# Patient Record
Sex: MALE | Race: WHITE | NOT HISPANIC OR LATINO | Employment: UNEMPLOYED | URBAN - METROPOLITAN AREA
[De-identification: names, ages, dates, MRNs, and addresses within clinical notes are randomized per-mention and may not be internally consistent; named-entity substitution may affect disease eponyms.]

---

## 2017-01-11 ENCOUNTER — HOSPITAL ENCOUNTER (EMERGENCY)
Facility: HOSPITAL | Age: 22
Discharge: HOME | End: 2017-01-11
Attending: EMERGENCY MEDICINE
Payer: MEDICAID

## 2017-01-11 VITALS
BODY MASS INDEX: 17.89 KG/M2 | TEMPERATURE: 98 F | DIASTOLIC BLOOD PRESSURE: 60 MMHG | HEART RATE: 82 BPM | RESPIRATION RATE: 18 BRPM | WEIGHT: 135 LBS | HEIGHT: 73 IN | OXYGEN SATURATION: 99 % | SYSTOLIC BLOOD PRESSURE: 124 MMHG

## 2017-01-11 DIAGNOSIS — R05.9 COUGH: ICD-10-CM

## 2017-01-11 DIAGNOSIS — J11.1 INFLUENZA: Primary | ICD-10-CM

## 2017-01-11 PROCEDURE — 99283 EMERGENCY DEPT VISIT LOW MDM: CPT

## 2017-01-11 PROCEDURE — 25000003 PHARM REV CODE 250: Performed by: NURSE PRACTITIONER

## 2017-01-11 RX ORDER — ONDANSETRON 4 MG/1
4 TABLET, ORALLY DISINTEGRATING ORAL
Status: COMPLETED | OUTPATIENT
Start: 2017-01-11 | End: 2017-01-11

## 2017-01-11 RX ORDER — ACETAMINOPHEN 500 MG
1000 TABLET ORAL
Status: COMPLETED | OUTPATIENT
Start: 2017-01-11 | End: 2017-01-11

## 2017-01-11 RX ORDER — ONDANSETRON 4 MG/1
4 TABLET, ORALLY DISINTEGRATING ORAL EVERY 8 HOURS PRN
Qty: 15 TABLET | Refills: 0 | Status: SHIPPED | OUTPATIENT
Start: 2017-01-11 | End: 2021-06-03

## 2017-01-11 RX ORDER — PROMETHAZINE HYDROCHLORIDE AND CODEINE PHOSPHATE 6.25; 1 MG/5ML; MG/5ML
5 SOLUTION ORAL EVERY 4 HOURS PRN
Qty: 118 ML | Refills: 0 | Status: SHIPPED | OUTPATIENT
Start: 2017-01-11 | End: 2017-01-21

## 2017-01-11 RX ADMIN — ONDANSETRON 4 MG: 4 TABLET, ORALLY DISINTEGRATING ORAL at 02:01

## 2017-01-11 RX ADMIN — ACETAMINOPHEN 1000 MG: 500 TABLET ORAL at 02:01

## 2017-01-11 NOTE — DISCHARGE INSTRUCTIONS
Please get plenty of rest and drink lots of water to stay well-hydrated.    Try to start with clear liquids, and if tolerated, you can graduate to solid foods.  Eat a bland diet; avoid eating spicy, greasy, and acidic foods (see handout).    Take zofran for nausea/vomiting, as needed.    You can take phenergan with codeine for your cough, though use with caution, as it may cause drowsiness.    Take tylenol and/or ibuprofen for fevers and bodyaches.    Follow up with your regular doctor in 2-3 days for further evaluation of your symptoms, if they persist.    Return to the ER for any new/worsening symptoms.

## 2017-01-11 NOTE — ED TRIAGE NOTES
"Pt presents to the ED personal transport. Pt reports a dry itchy, cough x 3 days w/ a sore throat. Pt also reporting decreased appetite, headache ("everywhere"), and generalized myalgias for 3 days. Pt reports he was seen at [facility] 2 days ago and told to take Zyrtec. Pt reports he has taken 5 Zyrtec since, with other OTC medications w/ no relief. Pt reports he has not been able to sleep, pt reports "I just really want to sleep."   "

## 2017-01-11 NOTE — ED AVS SNAPSHOT
[facility]  [place] [address]               Galileo QUACH [place]   2017  1:31 AM   ED    Description:  Male : 1995   Department:  [facility]           Your Care was Coordinated By:     Provider Role From To    Evy Cisse NP Nurse Practitioner 17 0132 --      Reason for Visit     URI           Diagnoses this Visit        Comments    Influenza    -  Primary     Cough           ED Disposition     ED Disposition Condition Comment    Discharge             To Do List           Follow-up Information     Follow up with [facility].    Specialty:  Emergency Medicine    Why:  If symptoms worsen    Contact information:    [address] [place] [address] [phone]        Follow up with Anju Ruffin MD In 3 days.    Specialty:  Pediatrics    Why:  As needed    Contact information:    [place] [address]  [phone]         These Medications        Disp Refills Start End    promethazine-codeine 6.25-10 mg/5 ml (PHENERGAN WITH CODEINE) 6.25-10 mg/5 mL syrup 118 mL 0 2017    Take 5 mLs by mouth every 4 (four) hours as needed for Cough. - Oral    ondansetron (ZOFRAN-ODT) 4 MG TbDL 15 tablet 0 2017     Take 1 tablet (4 mg total) by mouth every 8 (eight) hours as needed (Nausea or vomiting). - Oral      [facility]     [facility] -  Assistance  Registered nurses in the [facility] provide clinical advisement, health education, appointment booking, and other advisory services.  Call for this free service at [phone].             Medications           START taking these NEW medications        Refills    promethazine-codeine 6.25-10 mg/5 ml (PHENERGAN WITH CODEINE) 6.25-10 mg/5 mL syrup 0    Sig: Take 5 mLs by mouth every 4 (four) hours as needed for Cough.    Class: Print    Route: Oral    ondansetron  "(ZOFRAN-ODT) 4 MG TbDL 0    Sig: Take 1 tablet (4 mg total) by mouth every 8 (eight) hours as needed (Nausea or vomiting).    Class: Print    Route: Oral      These medications were administered today        Dose Freq    acetaminophen tablet 1,000 mg 1,000 mg ED 1 Time    Sig: Take 2 tablets (1,000 mg total) by mouth ED 1 Time.    Class: Normal    Route: Oral    ondansetron disintegrating tablet 4 mg 4 mg ED 1 Time    Sig: Take 1 tablet (4 mg total) by mouth ED 1 Time.    Class: Normal    Route: Oral           Verify that the below list of medications is an accurate representation of the medications you are currently taking.  If none reported, the list may be blank. If incorrect, please contact your healthcare provider. Carry this list with you in case of emergency.           Current Medications     ondansetron (ZOFRAN-ODT) 4 MG TbDL Take 1 tablet (4 mg total) by mouth every 8 (eight) hours as needed (Nausea or vomiting).    promethazine-codeine 6.25-10 mg/5 ml (PHENERGAN WITH CODEINE) 6.25-10 mg/5 mL syrup Take 5 mLs by mouth every 4 (four) hours as needed for Cough.           Clinical Reference Information           Your Vitals Were     BP Pulse Temp Resp Height Weight    121/62 (BP Location: Right arm, Patient Position: Sitting) 89 98.2 °F (36.8 °C) (Oral) 18 6' 1" (1.854 m) 61.2 kg (135 lb)    SpO2 BMI             99% 17.81 kg/m2         Allergies as of 1/11/2017        Reactions    Nuts [Tree Nut] Swelling    Peanuts. Has epipen in car.       Immunizations Administered on Date of Encounter - 1/11/2017     None      ED Micro, Lab, POCT     None      ED Imaging Orders     Start Ordered       Status Ordering Provider    01/11/17 0154 01/11/17 0154  X-Ray Chest PA And Lateral  1 time imaging      Final result         Discharge Instructions       Please get plenty of rest and drink lots of water to stay well-hydrated.    Try to start with clear liquids, and if tolerated, you can graduate to solid foods.  Eat a bland " diet; avoid eating spicy, greasy, and acidic foods (see handout).    Take zofran for nausea/vomiting, as needed.    You can take phenergan with codeine for your cough, though use with caution, as it may cause drowsiness.    Take tylenol and/or ibuprofen for fevers and bodyaches.    Follow up with your regular doctor in 2-3 days for further evaluation of your symptoms, if they persist.    Return to the ER for any new/worsening symptoms.        Discharge References/Attachments     INFLUENZA (ADULT) (ENGLISH)    DIET, VOMITING OR DIARRHEA (ADULT) (ENGLISH)    FEVER CONTROL (ADULT) (ENGLISH)      [facility] Sign-Up     Activating your [facility] account is as easy as 1-2-3!     1) Visit [email or web address], select Sign Up Now, enter this activation code and your date of birth, then select Next.  [email or web address]  Expires: 2/25/2017  2:42 AM      2) Create a username and password to use when you visit [facility] in the future and select a security question in case you lose your password and select Next.    3) Enter your e-mail address and click Sign Up!    Additional Information  If you have questions, please e-mail [email or web address] or call [phone] to talk to our [facility] staff. Remember, [facility] is NOT to be used for urgent needs. For medical emergencies, dial 911.         Smoking Cessation     If you would like to quit smoking:   You may be eligible for free services if you are a [place] resident and started smoking cigarettes before September 1, 1988.  Call the Smoking Cessation Trust (SCT) toll free at (106) 462-9157 or (907) 760-6702.   Call 9-112-QUIT-NOW if you do not meet the above criteria.             [facility] complies with applicable Federal civil rights laws and does not discriminate on the basis of race, color, national origin, age, disability, or sex.        Language Assistance Services     ATTENTION: Language assistance services are available, free of charge. Please call  [phone].      ATENCIÓN: Si habla español, tiene a arreguin disposición servicios gratuitos de asistencia lingüística. Llame al [phone].     CHÚ Ý: N?u b?n nói Ti?ng Vi?t, có các d?ch v? h? tr? ngôn ng? mi?n phí dành cho b?n. G?i s? [phone].

## 2017-01-11 NOTE — ED PROVIDER NOTES
Encounter Date: 1/11/2017    SCRIBE #1 NOTE: I, Melissa Manuel, am scribing for, and in the presence of,  RICARDO Pandya. I have scribed the following portions of the note - Other sections scribed: HPI, ROS.       History     Chief Complaint   Patient presents with    URI     Nasal drainage, cough, and sore throat.      Review of patient's allergies indicates:   Allergen Reactions    Nuts [tree nut] Swelling     Peanuts. Has epipen in car.      HPI Comments: CC: URI    HPI: 21 year old male smoker with HTN presents to the ED c/o fever (Tmax 102F), decreased appetite, sore throat, productive cough that is currently dry, headaches, generalized body aches, rhinorrhea, nasal congestion, and NBNB vomiting (> 10x) x 2 days. Symptoms are acute onset and severe (10/10). Pt states he was evaluated at [facility] and was discharged home with Zyrtec which provided no relief. He reports he also had treatment with child cold and cough syrup a few hours PTA with no relief. He states he is unable to eat solids and reports only able to tolerate a sip of Sprite earlier today. Pt denies chest pain, SOB, abdominal pain, dysuria, diarrhea, and any other associated symptoms. No alleviating factors.    The history is provided by the patient. No  was used.     Past Medical History   Diagnosis Date    Hypertension     IBS (irritable bowel syndrome)      No past medical history pertinent negatives.  No past surgical history on file.  Family History   Problem Relation Age of Onset    Heart disease Father     Diabetes Maternal Grandfather     Hypertension Maternal Grandfather     Drug abuse Paternal Grandmother      Social History   Substance Use Topics    Smoking status: Current Every Day Smoker     Packs/day: 1.00     Types: Cigarettes    Smokeless tobacco: None    Alcohol use No     Review of Systems   Constitutional: Positive for appetite change (decreased appetite) and fever (Tmax 102 F). Negative for chills and  diaphoresis.   HENT: Positive for congestion, rhinorrhea and sore throat. Negative for ear pain.    Eyes: Negative for photophobia and visual disturbance.   Respiratory: Positive for cough. Negative for shortness of breath.    Cardiovascular: Negative for chest pain.   Gastrointestinal: Positive for nausea and vomiting. Negative for abdominal pain and diarrhea.   Genitourinary: Negative for dysuria.   Musculoskeletal: Positive for myalgias (generalized body aches). Negative for back pain.   Skin: Negative for rash.   Neurological: Positive for headaches.       Physical Exam   Initial Vitals   BP Pulse Resp Temp SpO2   01/11/17 0119 01/11/17 0119 01/11/17 0119 01/11/17 0119 01/11/17 0119   121/62 89 18 98.2 °F (36.8 °C) 99 %     Physical Exam    Nursing note and vitals reviewed.  Constitutional: He appears well-developed and well-nourished. He is not diaphoretic. He appears distressed.   HENT:   Nose: Mucosal edema and rhinorrhea present. No sinus tenderness.   Mouth/Throat: Uvula is midline and mucous membranes are normal. Mucous membranes are not dry. No trismus in the jaw. Posterior oropharyngeal erythema present. No oropharyngeal exudate, posterior oropharyngeal edema or tonsillar abscesses.   Bilateral TMs clear   Eyes: EOM are normal. Pupils are equal, round, and reactive to light.   Neck: Phonation normal. Neck supple. No stridor present.   No meningismus   Cardiovascular: Normal heart sounds.   Pulmonary/Chest: No respiratory distress. He has no wheezes. He has no rales.   Slight expiratory rhonchi noted to all fields   Abdominal: Soft. Bowel sounds are normal. He exhibits no distension and no mass. There is no rebound and no guarding.   No focal abdominal tenderness   Lymphadenopathy:     He has no cervical adenopathy.   Neurological: He is alert and oriented to person, place, and time.   Skin: Skin is warm and dry.         ED Course   Procedures  Labs Reviewed - No data to display       X-Rays:    Independently Interpreted Readings:   Chest X-Ray: No infiltrates.  No acute abnormalities. No focal consolidations or infiltrates        Additional MDM:   Comments: This is an urgent evaluation of a 21-year-old male that presents to the emergency room with cold symptoms for 3 days.  Patient reports associated fevers, myalgias, rhinorrhea, sore throat, cough. He appears ill, but is nontoxic. He is afebrile with normal vital signs.  His exam was suggestive of a viral respiratory illness, though he did have expiratory rhonchi noted to all fields; patient reports that he recently quit smoking over new years.  His CXR was negative for pneumonia. I suspect this is influenza.  There is no evidence of other serious bacterial infection such as meningitis, strep pharyngitis, AOM, sinusitis, or acute abdomen.  He does not appear severely dehydrated.  He was given zofran in ED with resolution of nausea; he declined to wait for PO challenge.  His symptoms have been >72 hours and does not meet criteria for influenza testing/tamiflu treatment.  I see no indication for further workup today.    Encouraged oral hydration, rest, and fever care.  Rx zofran and antitussive for supportive care.  To f/u with PCP in 2-3 days.  Return precautions for new or worsening symptoms or concerns.  Case discussed with attending, , and he is in agreement with plan. Stable for discharge and outpatient follow.  .          Scribe Attestation:   Scribe #1: I performed the above scribed service and the documentation accurately describes the services I performed. I attest to the accuracy of the note.    Attending Attestation:     Physician Attestation Statement for NP/PA:   I discussed this assessment and plan of this patient with the NP/PA, but I did not personally examine the patient. The face to face encounter was performed by the NP/PA.    Other NP/PA Attestation Additions:      Medical Decision Makin-year-old male presents complaining  of fever, sore throat, productive cough, body aches, rhinorrhea, vomiting for the last 2 days.  Patient was reportedly diagnosed with the flu at an outside facility but has not had adequate relief.  Physical examination does not reveal any significant vital sign abnormalities, abdominal tenderness, or concerning cardiopulmonary findings.    I have personally reviewed and interpreted the patient's chest x-ray and there is no acute abnormality.    Agree with above plan.       Physician Attestation for Scribe:  Physician Attestation Statement for Scribe #1: I, Evy Cisse, CLARA-C, reviewed documentation, as scribed by Melissa Manuel in my presence, and it is both accurate and complete.                 ED Course     Clinical Impression:   The primary encounter diagnosis was Influenza. A diagnosis of Cough was also pertinent to this visit.    Disposition:   Disposition: Discharged  Condition: Stable       Evy Cisse NP  01/11/17 0445

## 2017-01-12 ENCOUNTER — HOSPITAL ENCOUNTER (EMERGENCY)
Facility: HOSPITAL | Age: 22
Discharge: HOME | End: 2017-01-13
Attending: EMERGENCY MEDICINE
Payer: MEDICAID

## 2017-01-12 DIAGNOSIS — R00.0 TACHYCARDIA: ICD-10-CM

## 2017-01-12 DIAGNOSIS — R05.9 COUGH: Primary | ICD-10-CM

## 2017-01-12 DIAGNOSIS — R10.84 GENERALIZED ABDOMINAL CRAMPING: ICD-10-CM

## 2017-01-12 DIAGNOSIS — M79.10 MYALGIA: ICD-10-CM

## 2017-01-12 LAB
ALBUMIN SERPL BCP-MCNC: 3.1 G/DL
ALP SERPL-CCNC: 80 U/L
ALT SERPL W/O P-5'-P-CCNC: 9 U/L
ANION GAP SERPL CALC-SCNC: 13 MMOL/L
AST SERPL-CCNC: 20 U/L
BASOPHILS # BLD AUTO: 0.01 K/UL
BASOPHILS NFR BLD: 0.1 %
BILIRUB SERPL-MCNC: 1 MG/DL
BUN SERPL-MCNC: 8 MG/DL
CALCIUM SERPL-MCNC: 8.6 MG/DL
CHLORIDE SERPL-SCNC: 106 MMOL/L
CO2 SERPL-SCNC: 20 MMOL/L
CREAT SERPL-MCNC: 1 MG/DL
DIFFERENTIAL METHOD: ABNORMAL
EOSINOPHIL # BLD AUTO: 0 K/UL
EOSINOPHIL NFR BLD: 0.2 %
ERYTHROCYTE [DISTWIDTH] IN BLOOD BY AUTOMATED COUNT: 12.3 %
EST. GFR  (AFRICAN AMERICAN): >60 ML/MIN/1.73 M^2
EST. GFR  (NON AFRICAN AMERICAN): >60 ML/MIN/1.73 M^2
GLUCOSE SERPL-MCNC: 150 MG/DL
HCT VFR BLD AUTO: 37.5 %
HGB BLD-MCNC: 13.6 G/DL
LACTATE SERPL-SCNC: 2.3 MMOL/L
LYMPHOCYTES # BLD AUTO: 2 K/UL
LYMPHOCYTES NFR BLD: 13.5 %
MAGNESIUM SERPL-MCNC: 1.5 MG/DL
MCH RBC QN AUTO: 31 PG
MCHC RBC AUTO-ENTMCNC: 36.3 %
MCV RBC AUTO: 85 FL
MONOCYTES # BLD AUTO: 0.8 K/UL
MONOCYTES NFR BLD: 5.7 %
NEUTROPHILS # BLD AUTO: 11.6 K/UL
NEUTROPHILS NFR BLD: 80.5 %
PLATELET # BLD AUTO: 180 K/UL
PMV BLD AUTO: 9.1 FL
POTASSIUM SERPL-SCNC: 2.8 MMOL/L
PROT SERPL-MCNC: 6.6 G/DL
RBC # BLD AUTO: 4.39 M/UL
SODIUM SERPL-SCNC: 139 MMOL/L
WBC # BLD AUTO: 14.45 K/UL

## 2017-01-12 PROCEDURE — 99285 EMERGENCY DEPT VISIT HI MDM: CPT | Mod: 25

## 2017-01-12 PROCEDURE — 25000003 PHARM REV CODE 250: Performed by: EMERGENCY MEDICINE

## 2017-01-12 PROCEDURE — 94640 AIRWAY INHALATION TREATMENT: CPT

## 2017-01-12 PROCEDURE — 85025 COMPLETE CBC W/AUTO DIFF WBC: CPT

## 2017-01-12 PROCEDURE — 80053 COMPREHEN METABOLIC PANEL: CPT

## 2017-01-12 PROCEDURE — 84443 ASSAY THYROID STIM HORMONE: CPT

## 2017-01-12 PROCEDURE — 25000242 PHARM REV CODE 250 ALT 637 W/ HCPCS: Performed by: EMERGENCY MEDICINE

## 2017-01-12 PROCEDURE — 96365 THER/PROPH/DIAG IV INF INIT: CPT

## 2017-01-12 PROCEDURE — 81003 URINALYSIS AUTO W/O SCOPE: CPT

## 2017-01-12 PROCEDURE — 96361 HYDRATE IV INFUSION ADD-ON: CPT

## 2017-01-12 PROCEDURE — 63600175 PHARM REV CODE 636 W HCPCS: Performed by: EMERGENCY MEDICINE

## 2017-01-12 PROCEDURE — 83735 ASSAY OF MAGNESIUM: CPT

## 2017-01-12 PROCEDURE — 87086 URINE CULTURE/COLONY COUNT: CPT

## 2017-01-12 PROCEDURE — 83605 ASSAY OF LACTIC ACID: CPT

## 2017-01-12 PROCEDURE — 87400 INFLUENZA A/B EACH AG IA: CPT

## 2017-01-12 PROCEDURE — 87040 BLOOD CULTURE FOR BACTERIA: CPT | Mod: 59

## 2017-01-12 RX ORDER — IPRATROPIUM BROMIDE AND ALBUTEROL SULFATE 2.5; .5 MG/3ML; MG/3ML
3 SOLUTION RESPIRATORY (INHALATION)
Status: COMPLETED | OUTPATIENT
Start: 2017-01-12 | End: 2017-01-12

## 2017-01-12 RX ORDER — POTASSIUM CHLORIDE 20 MEQ/1
60 TABLET, EXTENDED RELEASE ORAL
Status: COMPLETED | OUTPATIENT
Start: 2017-01-12 | End: 2017-01-12

## 2017-01-12 RX ORDER — SODIUM CHLORIDE 9 MG/ML
1000 INJECTION, SOLUTION INTRAVENOUS
Status: COMPLETED | OUTPATIENT
Start: 2017-01-12 | End: 2017-01-13

## 2017-01-12 RX ORDER — MAGNESIUM SULFATE 1 G/100ML
1 INJECTION INTRAVENOUS
Status: COMPLETED | OUTPATIENT
Start: 2017-01-12 | End: 2017-01-13

## 2017-01-12 RX ADMIN — IPRATROPIUM BROMIDE AND ALBUTEROL SULFATE 3 ML: .5; 3 SOLUTION RESPIRATORY (INHALATION) at 11:01

## 2017-01-12 RX ADMIN — MAGNESIUM SULFATE IN DEXTROSE 1 G: 10 INJECTION, SOLUTION INTRAVENOUS at 11:01

## 2017-01-12 RX ADMIN — SODIUM CHLORIDE 1000 ML: 0.9 INJECTION, SOLUTION INTRAVENOUS at 10:01

## 2017-01-12 RX ADMIN — IPRATROPIUM BROMIDE AND ALBUTEROL SULFATE 3 ML: .5; 3 SOLUTION RESPIRATORY (INHALATION) at 10:01

## 2017-01-12 RX ADMIN — SODIUM CHLORIDE 1000 ML: 0.9 INJECTION, SOLUTION INTRAVENOUS at 11:01

## 2017-01-12 RX ADMIN — POTASSIUM CHLORIDE 60 MEQ: 1500 TABLET, EXTENDED RELEASE ORAL at 11:01

## 2017-01-12 NOTE — ED AVS SNAPSHOT
[facility]  [address] [address]               Galileo QUACH [place]   2017 10:14 PM   ED    Description:  Male : 1995   Department:  [facility]           Your Care was Coordinated By:     Provider Role From To    Elder Vang MD Attending Provider 17 2215 --      Reason for Visit     Shortness of Breath           Diagnoses this Visit        Comments    Cough    -  Primary     Myalgia         Generalized abdominal cramping         Tachycardia           ED Disposition     ED Disposition Condition Comment    Discharge             To Do List           Follow-up Information     Follow up with Your doctor. Schedule an appointment as soon as possible for a visit in 3 days.       These Medications        Disp Refills Start End    azithromycin (Z-ANNA) 250 MG tablet 6 tablet 0 2017    Take 1 tablet (250 mg total) by mouth once daily. Take first 2 tablets together, then 1 every day until finished. - Oral      [facility]     [facility] Care [facility] -  Assistance  Registered nurses in the [facility] provide clinical advisement, health education, appointment booking, and other advisory services.  Call for this free service at [phone].             Medications           START taking these NEW medications        Refills    azithromycin (Z-ANNA) 250 MG tablet 0    Sig: Take 1 tablet (250 mg total) by mouth once daily. Take first 2 tablets together, then 1 every day until finished.    Class: Print    Route: Oral      These medications were administered today        Dose Freq    0.9%  NaCl infusion 1,000 mL ED 1 Time    Sig: Inject 1,000 mLs into the vein ED 1 Time.    Class: Normal    Route: Intravenous    albuterol-ipratropium 2.5mg-0.5mg/3mL nebulizer solution 3 mL 3 mL Every 5 min    Sig: Take 3 mLs by nebulization every 5 (five) minutes.    Class: Normal    Route: Nebulization    0.9%   NaCl infusion 1,000 mL ED 1 Time    Sig: Inject 1,000 mLs into the vein ED 1 Time.    Class: Normal    Route: Intravenous    magnesium sulfate in dextrose IVPB (premix) 1 g 1 g ED 1 Time    Sig: Inject 100 mLs (1 g total) into the vein ED 1 Time.    Class: Normal    Route: Intravenous    potassium chloride SA CR tablet 60 mEq 60 mEq ED 1 Time    Sig: Take 3 tablets (60 mEq total) by mouth ED 1 Time.    Class: Normal    Route: Oral    omnipaque 350 iohexol 75 mL 75 mL IMG once as needed    Sig: Inject 75 mLs into the vein ONCE PRN for contrast.    Class: Normal    Route: Intravenous    0.9%  NaCl infusion 1,000 mL ED 1 Time    Sig: Inject 1,000 mLs into the vein ED 1 Time.    Class: Normal    Route: Intravenous           Verify that the below list of medications is an accurate representation of the medications you are currently taking.  If none reported, the list may be blank. If incorrect, please contact your healthcare provider. Carry this list with you in case of emergency.           Current Medications     azithromycin (Z-ANNA) 250 MG tablet Take 1 tablet (250 mg total) by mouth once daily. Take first 2 tablets together, then 1 every day until finished.    ondansetron (ZOFRAN-ODT) 4 MG TbDL Take 1 tablet (4 mg total) by mouth every 8 (eight) hours as needed (Nausea or vomiting).    promethazine-codeine 6.25-10 mg/5 ml (PHENERGAN WITH CODEINE) 6.25-10 mg/5 mL syrup Take 5 mLs by mouth every 4 (four) hours as needed for Cough.           Clinical Reference Information           Your Vitals Were     BP Pulse Temp Resp Height Weight    111/53 118 99 °F (37.2 °C) (Oral) 18 6' (1.829 m) 59 kg (130 lb)    SpO2 BMI             95% 17.63 kg/m2         Allergies as of 1/13/2017        Reactions    Nuts [Tree Nut] Swelling    Peanuts. Has epipen in car.       Immunizations Administered on Date of Encounter - 1/13/2017     None      ED Micro, Lab, POCT     Start Ordered       Status Ordering Provider    01/13/17 0127 01/13/17  0126  TSH  STAT      Final result     01/12/17 2256 01/12/17 2255  Influenza antigen  STAT      Final result     01/12/17 2256 01/12/17 2255  Blood culture  STAT      In process     01/12/17 2256 01/12/17 2255  Blood culture  STAT      In process     01/12/17 2256 01/12/17 2255    STAT,   Status:  Canceled      Canceled     01/12/17 2256 01/12/17 2255  Urinalysis  STAT      Final result     01/12/17 2256 01/12/17 2255  Urine culture  STAT      In process     01/12/17 2221 01/12/17 2220  CBC auto differential  STAT      Final result     01/12/17 2221 01/12/17 2220  Comprehensive metabolic panel  STAT      Final result     01/12/17 2221 01/12/17 2220  Magnesium  STAT      Final result     01/12/17 2220 01/12/17 2220  Lactic acid, plasma  STAT      Final result       ED Imaging Orders     Start Ordered       Status Ordering Provider    01/13/17 0008 01/13/17 0007  CTA Chest Non-Coronary (PE Study)  1 time imaging      Final result     01/12/17 2221 01/12/17 2220  X-Ray Chest AP Portable  1 time imaging      Final result       Discharge References/Attachments     ABDOMINAL PAIN, ADULT (ENGLISH)    MYALGIAS (ENGLISH)    VIRAL SYNDROME (ADULT) (ENGLISH)      [facility] Sign-Up     Activating your [facility] account is as easy as 1-2-3!     1) Visit [email or web address], select Sign Up Now, enter this activation code and your date of birth, then select Next.  [email or web address]  Expires: 2/25/2017  2:42 AM      2) Create a username and password to use when you visit [facility] in the future and select a security question in case you lose your password and select Next.    3) Enter your e-mail address and click Sign Up!    Additional Information  If you have questions, please e-mail [email or web address] or call [phone] to talk to our [facility] staff. Remember, [facility] is NOT to be used for urgent needs. For medical emergencies, dial 911.         Smoking Cessation     If you would like to quit smoking:   You may be eligible  for free services if you are a [place] resident and started smoking cigarettes before September 1, 1988.  Call the Smoking Cessation Trust (SCT) toll free at (245) 579-2006 or (580) 247-7569.   Call 1-800-QUIT-NOW if you do not meet the above criteria.             [facility] complies with applicable Federal civil rights laws and does not discriminate on the basis of race, color, national origin, age, disability, or sex.        Language Assistance Services     ATTENTION: Language assistance services are available, free of charge. Please call [phone].      ATENCIÓN: Si habla español, tiene a arreguin disposición servicios gratuitos de asistencia lingüística. Llame al [phone].     CHÚ Ý: N?u b?n nói Ti?ng Vi?t, có các d?ch v? h? tr? ngôn ng? mi?n phí dành cho b?n. G?i s? [phone].

## 2017-01-13 VITALS
DIASTOLIC BLOOD PRESSURE: 64 MMHG | HEART RATE: 99 BPM | OXYGEN SATURATION: 100 % | SYSTOLIC BLOOD PRESSURE: 114 MMHG | RESPIRATION RATE: 20 BRPM | HEIGHT: 72 IN | BODY MASS INDEX: 17.61 KG/M2 | TEMPERATURE: 98 F | WEIGHT: 130 LBS

## 2017-01-13 LAB
BILIRUB UR QL STRIP: NEGATIVE
CLARITY UR: CLEAR
COLOR UR: YELLOW
FLUAV AG SPEC QL IA: NEGATIVE
FLUBV AG SPEC QL IA: NEGATIVE
GLUCOSE UR QL STRIP: ABNORMAL
HGB UR QL STRIP: NEGATIVE
KETONES UR QL STRIP: ABNORMAL
LEUKOCYTE ESTERASE UR QL STRIP: NEGATIVE
NITRITE UR QL STRIP: NEGATIVE
PH UR STRIP: 8 [PH] (ref 5–8)
PROT UR QL STRIP: NEGATIVE
SP GR UR STRIP: 1.01 (ref 1–1.03)
SPECIMEN SOURCE: NORMAL
TSH SERPL DL<=0.005 MIU/L-ACNC: 1.26 UIU/ML
URN SPEC COLLECT METH UR: ABNORMAL
UROBILINOGEN UR STRIP-ACNC: ABNORMAL EU/DL

## 2017-01-13 PROCEDURE — 25500020 PHARM REV CODE 255: Performed by: EMERGENCY MEDICINE

## 2017-01-13 PROCEDURE — 25000003 PHARM REV CODE 250: Performed by: EMERGENCY MEDICINE

## 2017-01-13 RX ORDER — SODIUM CHLORIDE 9 MG/ML
1000 INJECTION, SOLUTION INTRAVENOUS
Status: COMPLETED | OUTPATIENT
Start: 2017-01-13 | End: 2017-01-13

## 2017-01-13 RX ORDER — AZITHROMYCIN 250 MG/1
250 TABLET, FILM COATED ORAL DAILY
Qty: 6 TABLET | Refills: 0 | Status: SHIPPED | OUTPATIENT
Start: 2017-01-13 | End: 2017-01-14

## 2017-01-13 RX ADMIN — SODIUM CHLORIDE 1000 ML: 0.9 INJECTION, SOLUTION INTRAVENOUS at 02:01

## 2017-01-13 RX ADMIN — IOHEXOL 75 ML: 350 INJECTION, SOLUTION INTRAVENOUS at 12:01

## 2017-01-13 NOTE — ED TRIAGE NOTES
Pt. Presents to ED with c/o SOB, FEVER, CHILLS, NAUSEA AND VOMITING that began 10 days ago also c/o generalized body aches, rates pain 10/10.

## 2017-01-13 NOTE — ED PROVIDER NOTES
"Encounter Date: 1/12/2017    SCRIBE #1 NOTE: I, Clementambika Kang, am scribing for, and in the presence of,  Elder Vang MD. I have scribed the following portions of the note - Other sections scribed: ROS/ HPI.   SCRIBE #2 NOTE: ILisa, am scribing for, and in the presence of, Elder Vang MD.     History     Chief Complaint   Patient presents with    Shortness of Breath     and wheezing. diagnosed with the flu yesterday.      Review of patient's allergies indicates:   Allergen Reactions    Nuts [tree nut] Swelling     Peanuts. Has epipen in car.      HPI Comments: CC: SOB    HPI: 21 y.o. M with a PMHx of Hypertension and IBS presents to the ER via EMS c/o SOB with associated chest pain and wheezing beginning prior to arrival. Pt was given 2 doses of ativan with some relief of his SOB. Pt also reports body aches, sore throat, dry mouth and one episode of emesis. Pt has had a fever over the past few days with some relief using Tylenol but states. "It comes right back." Pt was dxed with the flu yesterday. Pt does not report of any other symptoms.    The history is provided by the patient and the EMS personnel.     Past Medical History   Diagnosis Date    Hypertension     IBS (irritable bowel syndrome)      No past medical history pertinent negatives.  No past surgical history on file.  Family History   Problem Relation Age of Onset    Heart disease Father     Diabetes Maternal Grandfather     Hypertension Maternal Grandfather     Drug abuse Paternal Grandmother      Social History   Substance Use Topics    Smoking status: Current Every Day Smoker     Packs/day: 1.00     Types: Cigarettes    Smokeless tobacco: Not on file    Alcohol use No     Review of Systems   Constitutional: Positive for fever.   HENT: Positive for sore throat.         (+) Dry mouth   Eyes: Negative for pain.   Respiratory: Positive for shortness of breath and wheezing.    Cardiovascular: Positive for chest pain. "   Gastrointestinal: Positive for vomiting.   Genitourinary: Negative for dysuria.   Musculoskeletal: Positive for myalgias (generalized).   Skin: Negative for rash and wound.   Neurological: Negative for headaches.       Physical Exam   Initial Vitals   BP Pulse Resp Temp SpO2   01/12/17 2211 01/12/17 2211 01/12/17 2211 01/12/17 2211 01/12/17 2211   124/50 118 18 99.5 °F (37.5 °C) 99 %     Physical Exam    Nursing note and vitals reviewed.  HENT:   Head: Atraumatic.   Mouth/Throat: Oropharynx is clear and moist.   Dry mucous membranes   Eyes: Conjunctivae and EOM are normal.   Neck: Normal range of motion.   Cardiovascular: Exam reveals no gallop and no friction rub.    No murmur heard.  Tachycardia   Pulmonary/Chest: Breath sounds normal. No respiratory distress. He has no wheezes. He has no rales.   Abdominal: Soft. Bowel sounds are normal. He exhibits no distension. There is tenderness (diffuse). There is no rebound.   Musculoskeletal: Normal range of motion. He exhibits no edema.   Neurological: He is alert and oriented to person, place, and time. He has normal strength. No cranial nerve deficit or sensory deficit.   Skin: Skin is warm and dry.   Psychiatric: He has a normal mood and affect.         ED Course   Procedures  Labs Reviewed   CBC W/ AUTO DIFFERENTIAL - Abnormal; Notable for the following:        Result Value    WBC 14.45 (*)     RBC 4.39 (*)     Hemoglobin 13.6 (*)     Hematocrit 37.5 (*)     MCHC 36.3 (*)     MPV 9.1 (*)     Gran # 11.6 (*)     Gran% 80.5 (*)     Lymph% 13.5 (*)     All other components within normal limits   COMPREHENSIVE METABOLIC PANEL - Abnormal; Notable for the following:     Potassium 2.8 (*)     CO2 20 (*)     Glucose 150 (*)     Calcium 8.6 (*)     Albumin 3.1 (*)     ALT 9 (*)     All other components within normal limits   MAGNESIUM - Abnormal; Notable for the following:     Magnesium 1.5 (*)     All other components within normal limits   LACTIC ACID, PLASMA - Abnormal;  Notable for the following:     Lactate (Lactic Acid) 2.3 (*)     All other components within normal limits   URINALYSIS - Abnormal; Notable for the following:     Glucose, UA 1+ (*)     Ketones, UA Trace (*)     Urobilinogen, UA 4.0-6.0 (*)     All other components within normal limits   CULTURE, BLOOD   CULTURE, BLOOD   CULTURE, URINE   INFLUENZA A AND B ANTIGEN   TSH             Medical Decision Making:   Initial Assessment:   21-year-old male with flulike symptoms for the past 3-4 days.  Was seen yesterday here where chest x-ray was unremarkable.  On exam he appears to be in mild respiratory distress.  He has mild extra wheezes bilaterally.  He appears somewhat dry.  No evidence of peritonsillar abscess.  Abdomen is diffusely tender.  Neck is supple.  I doubt meningitis.  Patient given IV fluids.  DDX includes Viral illness, pneumonia, pneumothorax, endocarditis, UTI, metabolic abnormality.  Chest x-ray reviewed and interpreted myself shows no pneumonia or pneumothorax. Mild leukocytosis noted.  Metabolic panel unremarkable.  Influenza negative.  Urinalysis shows no evidence of infection.  Patient persistently tachycardia and not febrile.  CTA shows no pulmonary embolism. There is a possible infection.  No large consolidative process.  TSH is normal.  Suspect a viral process in this patient.  I will cover him with azithromycin because of CT findings of possible infection.  Primary care follow-up.  Return instructions given.  Patient verbalized understanding and agreement with the plan.            Scribe Attestation:   Scribe #1: I performed the above scribed service and the documentation accurately describes the services I performed. I attest to the accuracy of the note.  Scribe #2: I performed the above scribed service and the documentation accurately describes the services I performed. I attest to the accuracy of the note.    Attending Attestation:           Physician Attestation for Scribe:  Physician  Attestation Statement for Scribe #1: I, Elder Vang MD, reviewed documentation, as scribed by Clement Kang in my presence, and it is both accurate and complete.   Physician Attestation Statement for Scribe #2: I, Elder Vang MD, reviewed documentation, as scribed by Lisa Phelps  in my presence, and it is both accurate and complete. I also acknowledge and confirm the content of the note done by Scribe #1.              ED Course     Clinical Impression:   The primary encounter diagnosis was Cough. Diagnoses of Myalgia, Generalized abdominal cramping, and Tachycardia were also pertinent to this visit.          Elder Vang MD  01/13/17 0229

## 2017-01-13 NOTE — ED NOTES
Pt in bed, semi-fowlers position, bed locked and in lowest position, SR up x2, call bell within reach, pt states pain is 8/10 at this time, respirations even and unlabored, family member at bedside, bathroom offered, possessions within reach, no acute distress noted at this time, will continue to monitor.

## 2017-01-13 NOTE — ED NOTES
Rounding on the patient has been done. he has been updated on the plan of care and his current status. Pain was assessed and is currently a 6/10. Comfort positioning and restroom needs were addressed. Necessary items were placed with in his reach and he was advised when a reassessment would take place. The call bell remains at the bedside for any additional patient needs. The patient is resting comfortably on the stretcher, respirations are even and unlabored, skin warm and dry. Will continue to monitor.

## 2017-01-14 ENCOUNTER — HOSPITAL ENCOUNTER (EMERGENCY)
Facility: HOSPITAL | Age: 22
Discharge: HOME | End: 2017-01-14
Attending: EMERGENCY MEDICINE
Payer: MEDICAID

## 2017-01-14 VITALS
WEIGHT: 130 LBS | BODY MASS INDEX: 17.61 KG/M2 | HEIGHT: 72 IN | DIASTOLIC BLOOD PRESSURE: 55 MMHG | OXYGEN SATURATION: 96 % | RESPIRATION RATE: 17 BRPM | HEART RATE: 72 BPM | SYSTOLIC BLOOD PRESSURE: 115 MMHG | TEMPERATURE: 98 F

## 2017-01-14 DIAGNOSIS — J18.9 PNEUMONIA OF LEFT LOWER LOBE DUE TO INFECTIOUS ORGANISM: Primary | ICD-10-CM

## 2017-01-14 LAB
ALBUMIN SERPL BCP-MCNC: 3.2 G/DL
ALP SERPL-CCNC: 97 U/L
ALT SERPL W/O P-5'-P-CCNC: 18 U/L
ANION GAP SERPL CALC-SCNC: 10 MMOL/L
AST SERPL-CCNC: 26 U/L
BASOPHILS # BLD AUTO: 0.02 K/UL
BASOPHILS NFR BLD: 0.1 %
BILIRUB SERPL-MCNC: 0.8 MG/DL
BUN SERPL-MCNC: 12 MG/DL
CALCIUM SERPL-MCNC: 9.5 MG/DL
CHLORIDE SERPL-SCNC: 108 MMOL/L
CO2 SERPL-SCNC: 23 MMOL/L
CREAT SERPL-MCNC: 0.9 MG/DL
DIFFERENTIAL METHOD: ABNORMAL
EOSINOPHIL # BLD AUTO: 0 K/UL
EOSINOPHIL NFR BLD: 0.1 %
ERYTHROCYTE [DISTWIDTH] IN BLOOD BY AUTOMATED COUNT: 13 %
EST. GFR  (AFRICAN AMERICAN): >60 ML/MIN/1.73 M^2
EST. GFR  (NON AFRICAN AMERICAN): >60 ML/MIN/1.73 M^2
GLUCOSE SERPL-MCNC: 100 MG/DL
HCT VFR BLD AUTO: 37 %
HGB BLD-MCNC: 13.1 G/DL
LYMPHOCYTES # BLD AUTO: 1.8 K/UL
LYMPHOCYTES NFR BLD: 9.5 %
MCH RBC QN AUTO: 30.5 PG
MCHC RBC AUTO-ENTMCNC: 35.4 %
MCV RBC AUTO: 86 FL
MONOCYTES # BLD AUTO: 1.6 K/UL
MONOCYTES NFR BLD: 8.1 %
NEUTROPHILS # BLD AUTO: 16 K/UL
NEUTROPHILS NFR BLD: 82.2 %
PLATELET # BLD AUTO: 221 K/UL
PMV BLD AUTO: 9 FL
POTASSIUM SERPL-SCNC: 3.9 MMOL/L
PROT SERPL-MCNC: 6.7 G/DL
RBC # BLD AUTO: 4.29 M/UL
SODIUM SERPL-SCNC: 141 MMOL/L
WBC # BLD AUTO: 19.43 K/UL

## 2017-01-14 PROCEDURE — 96367 TX/PROPH/DG ADDL SEQ IV INF: CPT

## 2017-01-14 PROCEDURE — 96361 HYDRATE IV INFUSION ADD-ON: CPT

## 2017-01-14 PROCEDURE — 80053 COMPREHEN METABOLIC PANEL: CPT

## 2017-01-14 PROCEDURE — 99285 EMERGENCY DEPT VISIT HI MDM: CPT

## 2017-01-14 PROCEDURE — 85025 COMPLETE CBC W/AUTO DIFF WBC: CPT

## 2017-01-14 PROCEDURE — 25000003 PHARM REV CODE 250: Performed by: EMERGENCY MEDICINE

## 2017-01-14 PROCEDURE — 63600175 PHARM REV CODE 636 W HCPCS: Performed by: EMERGENCY MEDICINE

## 2017-01-14 PROCEDURE — 96365 THER/PROPH/DIAG IV INF INIT: CPT

## 2017-01-14 RX ORDER — ONDANSETRON 2 MG/ML
8 INJECTION INTRAMUSCULAR; INTRAVENOUS
Status: COMPLETED | OUTPATIENT
Start: 2017-01-14 | End: 2017-01-14

## 2017-01-14 RX ORDER — ALBUTEROL SULFATE 90 UG/1
2 AEROSOL, METERED RESPIRATORY (INHALATION) EVERY 4 HOURS PRN
Qty: 1 INHALER | Refills: 1 | Status: SHIPPED | OUTPATIENT
Start: 2017-01-14 | End: 2017-02-13

## 2017-01-14 RX ORDER — MOXIFLOXACIN HYDROCHLORIDE 400 MG/1
400 TABLET ORAL
Status: COMPLETED | OUTPATIENT
Start: 2017-01-14 | End: 2017-01-14

## 2017-01-14 RX ORDER — BENZONATATE 200 MG/1
200 CAPSULE ORAL 3 TIMES DAILY PRN
Qty: 20 CAPSULE | Refills: 0 | Status: SHIPPED | OUTPATIENT
Start: 2017-01-14 | End: 2017-01-24

## 2017-01-14 RX ORDER — MOXIFLOXACIN HYDROCHLORIDE 400 MG/1
400 TABLET ORAL DAILY
Qty: 10 TABLET | Refills: 0 | Status: SHIPPED | OUTPATIENT
Start: 2017-01-14 | End: 2017-01-24

## 2017-01-14 RX ORDER — MAGNESIUM SULFATE 1 G/100ML
2 INJECTION INTRAVENOUS
Status: COMPLETED | OUTPATIENT
Start: 2017-01-14 | End: 2017-01-14

## 2017-01-14 RX ORDER — DEXAMETHASONE SODIUM PHOSPHATE 4 MG/ML
8 INJECTION, SOLUTION INTRA-ARTICULAR; INTRALESIONAL; INTRAMUSCULAR; INTRAVENOUS; SOFT TISSUE
Status: COMPLETED | OUTPATIENT
Start: 2017-01-14 | End: 2017-01-14

## 2017-01-14 RX ADMIN — DEXAMETHASONE SODIUM PHOSPHATE 8 MG: 4 INJECTION, SOLUTION INTRAMUSCULAR; INTRAVENOUS at 06:01

## 2017-01-14 RX ADMIN — ONDANSETRON 8 MG: 2 INJECTION INTRAMUSCULAR; INTRAVENOUS at 06:01

## 2017-01-14 RX ADMIN — CEFTRIAXONE 2 G: 2 INJECTION, SOLUTION INTRAVENOUS at 07:01

## 2017-01-14 RX ADMIN — SODIUM CHLORIDE 1000 ML: 0.9 INJECTION, SOLUTION INTRAVENOUS at 07:01

## 2017-01-14 RX ADMIN — SODIUM CHLORIDE 1000 ML: 0.9 INJECTION, SOLUTION INTRAVENOUS at 06:01

## 2017-01-14 RX ADMIN — MAGNESIUM SULFATE 2 G: 1 INJECTION INTRAVENOUS at 06:01

## 2017-01-14 RX ADMIN — MOXIFLOXACIN HYDROCHLORIDE 400 MG: 400 TABLET, FILM COATED ORAL at 07:01

## 2017-01-14 NOTE — ED NOTES
"Patient states that he has been seeing his dead godfather. Patient states that his godfather is stating, "it is ok. Let go. You can come with me." Patient aware that his godfather is dead.  "

## 2017-01-14 NOTE — ED PROVIDER NOTES
"Encounter Date: 1/14/2017    SCRIBE #1 NOTE: I, Jasmyn Means , am scribing for, and in the presence of,  Ochoa Rivera MD. I have scribed the following portions of the note - Other sections scribed: HPI/ROS .       History     Chief Complaint   Patient presents with    Influenza     seen in ED yesterday, felt better upon discharge and reports symptoms returned 7  hours ago, generalized body aches, cough with green sputum, headache      Review of patient's allergies indicates:   Allergen Reactions    Nuts [tree nut] Swelling     Peanuts. Has epipen in car.      HPI Comments: CC: Influenza     HPI: This 21 y.o. male smoker with HTN and hx of IBS presents to the ED via EMS accompanied by his girlfriend for re-evaluation of continued, worsening flu symptoms (dx 3 days ago at this facility) including intermittent fever, chills, generalized body aches, HA, NBNB N/V, and productive cough (green sputum) with associated SOB, CP, and abdominal pain after d/c from this facility yesterday. Pt states he has been vomiting more frequently since d/c yesterday. Symptoms are constant and severe. Per medical record review, pt has been evaluated at this facility for the same symptoms twice this week. He states at d/c yesterday, symptoms improved; however, he began feeling "bad" last night with symptoms worsening at 5 AM today (1.5 hours ago), prompting his arrival to the ED. Pt's girlfriend states he was d/c last night with a Z-sparkle which he began taking yesterday. She also reports she and their son were recently sick; however, both have since improved. Pt states, "This is the first time I've gotten sick in years." He reports not taking any medications for his IBS since "I last saw my pediatrician." Pt otherwise denies hematochezia, hematemesis, dizziness, visual changes, syncope.     The history is provided by the patient and the spouse. No  was used.     Past Medical History   Diagnosis Date    " Hypertension     IBS (irritable bowel syndrome)      No past medical history pertinent negatives.  History reviewed. No pertinent past surgical history.  Family History   Problem Relation Age of Onset    Heart disease Father     Diabetes Maternal Grandfather     Hypertension Maternal Grandfather     Drug abuse Paternal Grandmother      Social History   Substance Use Topics    Smoking status: Current Every Day Smoker     Packs/day: 1.00     Types: Cigarettes    Smokeless tobacco: None    Alcohol use No     Review of Systems   Constitutional: Positive for chills and fever. Negative for activity change and appetite change.   HENT: Negative for congestion, ear pain, rhinorrhea and sore throat.    Eyes: Negative for pain and visual disturbance.   Respiratory: Positive for cough (productive w/green sputum ) and shortness of breath.    Cardiovascular: Negative for chest pain.   Gastrointestinal: Positive for abdominal pain, nausea and vomiting. Negative for blood in stool, constipation and diarrhea.        (-) hematochezia  (-) hematemesis  (-) melenic stools    Genitourinary: Negative for difficulty urinating and dysuria.   Musculoskeletal: Positive for back pain and myalgias (generalized ). Negative for arthralgias.   Skin: Negative for rash.   Neurological: Negative for syncope, weakness, light-headedness, numbness and headaches.       Physical Exam   Initial Vitals   BP Pulse Resp Temp SpO2   01/14/17 0619 01/14/17 0619 01/14/17 0619 01/14/17 0619 01/14/17 0619   128/60 88 18 99.4 °F (37.4 °C) 98 %     Physical Exam    Nursing note and vitals reviewed.  Constitutional: He appears well-developed and well-nourished.   HENT:   Head: Normocephalic and atraumatic.   Eyes: Conjunctivae are normal. No scleral icterus.   Neck: Normal range of motion. Neck supple.   Cardiovascular: Normal rate and regular rhythm.   Pulmonary/Chest: Breath sounds normal.   Abdominal: Soft. There is no tenderness.   Musculoskeletal:  Normal range of motion. He exhibits no edema.   Neurological: He is alert and oriented to person, place, and time. He has normal strength.   Skin: Skin is warm and dry.   Psychiatric: He has a normal mood and affect. Thought content normal.         ED Course   Procedures  Labs Reviewed   CBC W/ AUTO DIFFERENTIAL - Abnormal; Notable for the following:        Result Value    WBC 19.43 (*)     RBC 4.29 (*)     Hemoglobin 13.1 (*)     Hematocrit 37.0 (*)     MPV 9.0 (*)     Gran # 16.0 (*)     Mono # 1.6 (*)     Gran% 82.2 (*)     Lymph% 9.5 (*)     All other components within normal limits   COMPREHENSIVE METABOLIC PANEL - Abnormal; Notable for the following:     Albumin 3.2 (*)     All other components within normal limits             Medical Decision Making:   History:   Old Medical Records: I decided to obtain old medical records.  Old Records Summarized: records from clinic visits and other records.  Independently Interpreted Test(s):   I have ordered and independently interpreted X-rays - see prior notes.  Clinical Tests:   Lab Tests: Ordered and Reviewed  Radiological Study: Ordered and Reviewed            Scribe Attestation:   Scribe #1: I performed the above scribed service and the documentation accurately describes the services I performed. I attest to the accuracy of the note.    Attending Attestation:           Physician Attestation for Scribe:  Physician Attestation Statement for Scribe #1: I, Ochoa Rivera MD, reviewed documentation, as scribed by Jasmyn Menas  in my presence, and it is both accurate and complete.           Initial differential diagnosis includes but is not limited to...Viral illness/syndrome, pneumonia, PE, cholecystitis, UTI, bacteremia/sepsis.    Emergent evaluation of a 21 y.o. male with flu like symptoms.  He reports being diagnosed with the flu 2 days prior, however he became concerned when his symptoms progressively worsened.  Chest x-ray concerning for possible left lower  lobe pneumonia.  Labs revealed a leukocytosis.   Inpatient management is not indicated at this time.  He has been started on Avelox prior to being d/c'd (x10 days).   He has been advised to follow-up with his primary care physician within 2-3 days for reevaluation and further management.  He is also encouraged to return to the emergency department should he develop any new or worsening symptoms.           ED Course     Clinical Impression:   The encounter diagnosis was Pneumonia of left lower lobe due to infectious organism.    Disposition:   Disposition: Discharged       Ochoa Rivera MD  02/13/17 5908

## 2017-01-14 NOTE — ED TRIAGE NOTES
Pt presents to ED via EMS with c/o flu like symptoms for 7 hours that include, cough, fever, chills, generalized body aches and headache.  Pt reports that he was seen in ED yesterday  but began feeling the same symptoms the past 7 hours. Pt reports coughing up green sputum at this time which he was not yesterday. Reports tylenol at 2300 last night. Denies n/v. No distress is noted. Will continue to be monitored.

## 2017-01-14 NOTE — ED AVS SNAPSHOT
[facility]  [place] [id number]               Galileo QUACH Elizabeth   2017  6:19 AM   ED    Description:  Male : 1995   Department:  [facility]           Your Care was Coordinated By:     Provider Role From To    Ochoa Rivera MD Attending Provider 17 0621 --      Reason for Visit     Influenza           Diagnoses this Visit        Comments    Pneumonia of left lower lobe due to infectious organism    -  Primary       ED Disposition     ED Disposition Condition Comment    Discharge             To Do List           Follow-up Information     Schedule an appointment as soon as possible for a visit with Primary care physician.       These Medications        Disp Refills Start End    moxifloxacin (AVELOX) 400 mg tablet 10 tablet 0 2017    Take 1 tablet (400 mg total) by mouth once daily. - Oral    albuterol 90 mcg/actuation inhaler 1 Inhaler 1 2017    Inhale 2 puffs into the lungs every 4 (four) hours as needed for Wheezing or Shortness of Breath (shortness of breath/wheezing). - Inhalation    benzonatate (TESSALON) 200 MG capsule 20 capsule 0 2017    Take 1 capsule (200 mg total) by mouth 3 (three) times daily as needed for Cough. - Oral      [facility]     [facility] -  Assistance  Registered nurses in the [facility] provide clinical advisement, health education, appointment booking, and other advisory services.  Call for this free service at [phone].             Medications           START taking these NEW medications        Refills    moxifloxacin (AVELOX) 400 mg tablet 0    Sig: Take 1 tablet (400 mg total) by mouth once daily.    Class: Print    Route: Oral    albuterol 90 mcg/actuation inhaler 1    Sig: Inhale 2 puffs into the lungs every 4 (four) hours as needed for Wheezing or Shortness of Breath (shortness of  breath/wheezing).    Class: Print    Route: Inhalation    benzonatate (TESSALON) 200 MG capsule 0    Sig: Take 1 capsule (200 mg total) by mouth 3 (three) times daily as needed for Cough.    Class: Print    Route: Oral      These medications were administered today        Dose Freq    sodium chloride 0.9% bolus 1,000 mL 1,000 mL ED 1 Time    Sig: Inject 1,000 mLs into the vein ED 1 Time.    Class: Normal    Route: Intravenous    ondansetron injection 8 mg 8 mg ED 1 Time    Sig: Inject 8 mg into the vein ED 1 Time.    Class: Normal    Route: Intravenous    dexamethasone injection 8 mg 8 mg ED 1 Time    Sig: Inject 2 mLs (8 mg total) into the vein ED 1 Time.    Class: Normal    Route: Intravenous    magnesium sulfate in dextrose IVPB (premix) 2 g 2 g ED 1 Time    Sig: Inject 200 mLs (2 g total) into the vein ED 1 Time.    Class: Normal    Route: Intravenous    sodium chloride 0.9% bolus 1,000 mL 1,000 mL ED 1 Time    Sig: Inject 1,000 mLs into the vein ED 1 Time.    Class: Normal    Route: Intravenous    moxifloxacin tablet 400 mg 400 mg ED 1 Time    Sig: Take 1 tablet (400 mg total) by mouth ED 1 Time.    Class: Normal    Route: Oral    cefTRIAXone (ROCEPHIN) 2 g in dextrose 5 % 50 mL IVPB 2 g ED 1 Time    Sig: Inject 50 mLs (2 g total) into the vein ED 1 Time.    Class: Normal    Route: Intravenous      STOP taking these medications     azithromycin (Z-ANNA) 250 MG tablet Take 1 tablet (250 mg total) by mouth once daily. Take first 2 tablets together, then 1 every day until finished.           Verify that the below list of medications is an accurate representation of the medications you are currently taking.  If none reported, the list may be blank. If incorrect, please contact your healthcare provider. Carry this list with you in case of emergency.           Current Medications     albuterol 90 mcg/actuation inhaler Inhale 2 puffs into the lungs every 4 (four) hours as needed for Wheezing or Shortness of Breath  (shortness of breath/wheezing).    benzonatate (TESSALON) 200 MG capsule Take 1 capsule (200 mg total) by mouth 3 (three) times daily as needed for Cough.    cefTRIAXone (ROCEPHIN) 2 g in dextrose 5 % 50 mL IVPB Inject 50 mLs (2 g total) into the vein ED 1 Time.    moxifloxacin (AVELOX) 400 mg tablet Take 1 tablet (400 mg total) by mouth once daily.    ondansetron (ZOFRAN-ODT) 4 MG TbDL Take 1 tablet (4 mg total) by mouth every 8 (eight) hours as needed (Nausea or vomiting).    promethazine-codeine 6.25-10 mg/5 ml (PHENERGAN WITH CODEINE) 6.25-10 mg/5 mL syrup Take 5 mLs by mouth every 4 (four) hours as needed for Cough.           Clinical Reference Information           Your Vitals Were     BP Pulse Temp Resp Height Weight    121/57 84 99.4 °F (37.4 °C) (Oral) 18 6' (1.829 m) 59 kg (130 lb)    SpO2 BMI             95% 17.63 kg/m2         Allergies as of 1/14/2017        Reactions    Nuts [Tree Nut] Swelling    Peanuts. Has epipen in car.       Immunizations Administered on Date of Encounter - 1/14/2017     None      ED Micro, Lab, POCT     Start Ordered       Status Ordering Provider    01/14/17 0639 01/14/17 0641  CBC auto differential  STAT      Final result     01/14/17 0639 01/14/17 0641  Comprehensive metabolic panel  STAT      Final result       ED Imaging Orders     None      Discharge References/Attachments     ADULT, PNEUMONIA (ENGLISH)      [facility] Sign-Up     Activating your [facility] account is as easy as 1-2-3!     1) Visit [email or web address], select Sign Up Now, enter this activation code and your date of birth, then select Next.  [email or web address]  Expires: 2/25/2017  2:42 AM      2) Create a username and password to use when you visit [facility] in the future and select a security question in case you lose your password and select Next.    3) Enter your e-mail address and click Sign Up!    Additional Information  If you have questions, please e-mail [email or web address] or call [phone] to talk to  our [facility] staff. Remember, [facility] is NOT to be used for urgent needs. For medical emergencies, dial 911.         Smoking Cessation     If you would like to quit smoking:   You may be eligible for free services if you are a [place] resident and started smoking cigarettes before September 1, 1988.  Call the [employer]oking [employer]essation [employer] ([employer]) toll free at (751) 627-3937 or (335) 964-3533.   Call 1-800-QUIT-NOW if you do not meet the above criteria.             [facility] complies with applicable Federal civil rights laws and does not discriminate on the basis of race, color, national origin, age, disability, or sex.        Language Assistance Services     ATTENTION: Language assistance services are available, free of charge. Please call [phone].      ATENCIÓN: Si habla español, tiene a arreguin disposición servicios gratuitos de asistencia lingüística. Llame al [phone].     CHÚ Ý: N?u b?n nói Ti?ng Vi?t, có các d?ch v? h? tr? ngôn ng? mi?n phí dành cho b?n. G?i s? [phone].

## 2017-01-15 LAB — BACTERIA UR CULT: NORMAL

## 2017-01-18 LAB
BACTERIA BLD CULT: NORMAL
BACTERIA BLD CULT: NORMAL

## 2019-10-15 ENCOUNTER — HOSPITAL ENCOUNTER (EMERGENCY)
Facility: HOSPITAL | Age: 24
Discharge: HOME | End: 2019-10-15
Attending: EMERGENCY MEDICINE
Payer: MEDICAID

## 2019-10-15 ENCOUNTER — OFFICE VISIT (OUTPATIENT)
Dept: URGENT CARE | Facility: CLINIC | Age: 24
End: 2019-10-15
Payer: MEDICAID

## 2019-10-15 VITALS
SYSTOLIC BLOOD PRESSURE: 113 MMHG | HEART RATE: 51 BPM | BODY MASS INDEX: 17.89 KG/M2 | HEIGHT: 73 IN | RESPIRATION RATE: 18 BRPM | TEMPERATURE: 98 F | OXYGEN SATURATION: 100 % | WEIGHT: 135 LBS | DIASTOLIC BLOOD PRESSURE: 63 MMHG

## 2019-10-15 VITALS
SYSTOLIC BLOOD PRESSURE: 104 MMHG | DIASTOLIC BLOOD PRESSURE: 65 MMHG | TEMPERATURE: 98 F | HEART RATE: 52 BPM | OXYGEN SATURATION: 100 % | RESPIRATION RATE: 18 BRPM

## 2019-10-15 DIAGNOSIS — N50.812 PAIN IN LEFT TESTICLE: ICD-10-CM

## 2019-10-15 DIAGNOSIS — N50.812 LEFT TESTICULAR PAIN: ICD-10-CM

## 2019-10-15 DIAGNOSIS — N50.812 TESTICULAR PAIN, LEFT: Primary | ICD-10-CM

## 2019-10-15 DIAGNOSIS — K40.90 INGUINAL HERNIA OF LEFT SIDE WITHOUT OBSTRUCTION OR GANGRENE: ICD-10-CM

## 2019-10-15 DIAGNOSIS — N45.1 EPIDIDYMITIS: Primary | ICD-10-CM

## 2019-10-15 DIAGNOSIS — R53.1 WEAKNESS: ICD-10-CM

## 2019-10-15 DIAGNOSIS — N43.3 HYDROCELE, UNSPECIFIED HYDROCELE TYPE: ICD-10-CM

## 2019-10-15 LAB
ALBUMIN SERPL BCP-MCNC: 4.1 G/DL (ref 3.5–5.2)
ALP SERPL-CCNC: 73 U/L (ref 55–135)
ALT SERPL W/O P-5'-P-CCNC: 11 U/L (ref 10–44)
ANION GAP SERPL CALC-SCNC: 5 MMOL/L (ref 8–16)
AST SERPL-CCNC: 19 U/L (ref 10–40)
BASOPHILS # BLD AUTO: 0.02 K/UL (ref 0–0.2)
BASOPHILS NFR BLD: 0.5 % (ref 0–1.9)
BILIRUB SERPL-MCNC: 0.6 MG/DL (ref 0.1–1)
BILIRUB UR QL STRIP: NEGATIVE
BILIRUB UR QL STRIP: NEGATIVE
BUN SERPL-MCNC: 11 MG/DL (ref 6–20)
CALCIUM SERPL-MCNC: 8.9 MG/DL (ref 8.7–10.5)
CHLORIDE SERPL-SCNC: 105 MMOL/L (ref 95–110)
CLARITY UR: CLEAR
CO2 SERPL-SCNC: 29 MMOL/L (ref 23–29)
COLOR UR: YELLOW
CREAT SERPL-MCNC: 1 MG/DL (ref 0.5–1.4)
CTP QC/QA: YES
DIFFERENTIAL METHOD: NORMAL
EOSINOPHIL # BLD AUTO: 0 K/UL (ref 0–0.5)
EOSINOPHIL NFR BLD: 0.7 % (ref 0–8)
ERYTHROCYTE [DISTWIDTH] IN BLOOD BY AUTOMATED COUNT: 13 % (ref 11.5–14.5)
EST. GFR  (AFRICAN AMERICAN): >60 ML/MIN/1.73 M^2
EST. GFR  (NON AFRICAN AMERICAN): >60 ML/MIN/1.73 M^2
FLUAV AG NPH QL: NEGATIVE
FLUBV AG NPH QL: NEGATIVE
GLUCOSE SERPL-MCNC: 91 MG/DL (ref 70–110)
GLUCOSE UR QL STRIP: NEGATIVE
GLUCOSE UR QL STRIP: NEGATIVE
HCT VFR BLD AUTO: 42.1 % (ref 40–54)
HGB BLD-MCNC: 14.4 G/DL (ref 14–18)
HGB UR QL STRIP: NEGATIVE
IMM GRANULOCYTES # BLD AUTO: 0.01 K/UL (ref 0–0.04)
IMM GRANULOCYTES NFR BLD AUTO: 0.2 % (ref 0–0.5)
INR PPP: 1.2 (ref 0.8–1.2)
KETONES UR QL STRIP: NEGATIVE
KETONES UR QL STRIP: NEGATIVE
LACTATE SERPL-SCNC: 0.9 MMOL/L (ref 0.5–2.2)
LEUKOCYTE ESTERASE UR QL STRIP: NEGATIVE
LEUKOCYTE ESTERASE UR QL STRIP: NEGATIVE
LIPASE SERPL-CCNC: 5 U/L (ref 4–60)
LYMPHOCYTES # BLD AUTO: 1.2 K/UL (ref 1–4.8)
LYMPHOCYTES NFR BLD: 28.5 % (ref 18–48)
MCH RBC QN AUTO: 30.4 PG (ref 27–31)
MCHC RBC AUTO-ENTMCNC: 34.2 G/DL (ref 32–36)
MCV RBC AUTO: 89 FL (ref 82–98)
MONOCYTES # BLD AUTO: 0.5 K/UL (ref 0.3–1)
MONOCYTES NFR BLD: 11.6 % (ref 4–15)
NEUTROPHILS # BLD AUTO: 2.5 K/UL (ref 1.8–7.7)
NEUTROPHILS NFR BLD: 58.5 % (ref 38–73)
NITRITE UR QL STRIP: NEGATIVE
NRBC BLD-RTO: 0 /100 WBC
PH UR STRIP: 6 [PH] (ref 5–8)
PH, POC UA: 6.5 (ref 5–8)
PLATELET # BLD AUTO: 152 K/UL (ref 150–350)
PMV BLD AUTO: 9.3 FL (ref 9.2–12.9)
POC BLOOD, URINE: NEGATIVE
POC NITRATES, URINE: NEGATIVE
POTASSIUM SERPL-SCNC: 4.1 MMOL/L (ref 3.5–5.1)
PROT SERPL-MCNC: 6.8 G/DL (ref 6–8.4)
PROT UR QL STRIP: NEGATIVE
PROT UR QL STRIP: NEGATIVE
PROTHROMBIN TIME: 12.7 SEC (ref 9–12.5)
RBC # BLD AUTO: 4.73 M/UL (ref 4.6–6.2)
SODIUM SERPL-SCNC: 139 MMOL/L (ref 136–145)
SP GR UR STRIP: 1.02 (ref 1–1.03)
SP GR UR STRIP: 1.03 (ref 1–1.03)
URN SPEC COLLECT METH UR: ABNORMAL
UROBILINOGEN UR STRIP-ACNC: ABNORMAL EU/DL
UROBILINOGEN UR STRIP-ACNC: NORMAL (ref 0.3–2.2)
WBC # BLD AUTO: 4.21 K/UL (ref 3.9–12.7)

## 2019-10-15 PROCEDURE — 87804 INFLUENZA ASSAY W/OPTIC: CPT | Mod: QW,S$GLB,, | Performed by: PHYSICIAN ASSISTANT

## 2019-10-15 PROCEDURE — 96374 THER/PROPH/DIAG INJ IV PUSH: CPT

## 2019-10-15 PROCEDURE — 83605 ASSAY OF LACTIC ACID: CPT

## 2019-10-15 PROCEDURE — 81003 URINALYSIS AUTO W/O SCOPE: CPT | Mod: QW,S$GLB,, | Performed by: PHYSICIAN ASSISTANT

## 2019-10-15 PROCEDURE — 99203 OFFICE O/P NEW LOW 30 MIN: CPT | Mod: S$GLB,,, | Performed by: PHYSICIAN ASSISTANT

## 2019-10-15 PROCEDURE — 80053 COMPREHEN METABOLIC PANEL: CPT

## 2019-10-15 PROCEDURE — 87804 POCT INFLUENZA A/B: ICD-10-PCS | Mod: 59,QW,S$GLB, | Performed by: PHYSICIAN ASSISTANT

## 2019-10-15 PROCEDURE — 81003 URINALYSIS AUTO W/O SCOPE: CPT

## 2019-10-15 PROCEDURE — 63600175 PHARM REV CODE 636 W HCPCS: Performed by: EMERGENCY MEDICINE

## 2019-10-15 PROCEDURE — 85025 COMPLETE CBC W/AUTO DIFF WBC: CPT

## 2019-10-15 PROCEDURE — 83690 ASSAY OF LIPASE: CPT

## 2019-10-15 PROCEDURE — 99203 PR OFFICE/OUTPT VISIT, NEW, LEVL III, 30-44 MIN: ICD-10-PCS | Mod: S$GLB,,, | Performed by: PHYSICIAN ASSISTANT

## 2019-10-15 PROCEDURE — 85610 PROTHROMBIN TIME: CPT

## 2019-10-15 PROCEDURE — 96361 HYDRATE IV INFUSION ADD-ON: CPT

## 2019-10-15 PROCEDURE — 87491 CHLMYD TRACH DNA AMP PROBE: CPT

## 2019-10-15 PROCEDURE — 81003 POCT URINALYSIS, DIPSTICK, AUTOMATED, W/O SCOPE: ICD-10-PCS | Mod: QW,S$GLB,, | Performed by: PHYSICIAN ASSISTANT

## 2019-10-15 PROCEDURE — 96375 TX/PRO/DX INJ NEW DRUG ADDON: CPT

## 2019-10-15 PROCEDURE — 99284 EMERGENCY DEPT VISIT MOD MDM: CPT | Mod: 25

## 2019-10-15 PROCEDURE — 96372 THER/PROPH/DIAG INJ SC/IM: CPT

## 2019-10-15 RX ORDER — ONDANSETRON 2 MG/ML
4 INJECTION INTRAMUSCULAR; INTRAVENOUS
Status: COMPLETED | OUTPATIENT
Start: 2019-10-15 | End: 2019-10-15

## 2019-10-15 RX ORDER — DOXYCYCLINE 100 MG/1
100 CAPSULE ORAL 2 TIMES DAILY
Qty: 20 CAPSULE | Refills: 0 | Status: SHIPPED | OUTPATIENT
Start: 2019-10-15 | End: 2019-10-25

## 2019-10-15 RX ORDER — CEFTRIAXONE 500 MG/1
250 INJECTION, POWDER, FOR SOLUTION INTRAMUSCULAR; INTRAVENOUS
Status: COMPLETED | OUTPATIENT
Start: 2019-10-15 | End: 2019-10-15

## 2019-10-15 RX ORDER — HYDROCODONE BITARTRATE AND ACETAMINOPHEN 5; 325 MG/1; MG/1
1 TABLET ORAL
Status: DISCONTINUED | OUTPATIENT
Start: 2019-10-15 | End: 2019-10-15

## 2019-10-15 RX ORDER — HYDROMORPHONE HYDROCHLORIDE 2 MG/ML
0.5 INJECTION, SOLUTION INTRAMUSCULAR; INTRAVENOUS; SUBCUTANEOUS
Status: COMPLETED | OUTPATIENT
Start: 2019-10-15 | End: 2019-10-15

## 2019-10-15 RX ADMIN — CEFTRIAXONE SODIUM 250 MG: 500 INJECTION, POWDER, FOR SOLUTION INTRAMUSCULAR; INTRAVENOUS at 03:10

## 2019-10-15 RX ADMIN — ONDANSETRON 4 MG: 2 INJECTION INTRAMUSCULAR; INTRAVENOUS at 02:10

## 2019-10-15 RX ADMIN — HYDROMORPHONE HYDROCHLORIDE 0.5 MG: 2 INJECTION, SOLUTION INTRAMUSCULAR; INTRAVENOUS; SUBCUTANEOUS at 02:10

## 2019-10-15 RX ADMIN — SODIUM CHLORIDE, SODIUM LACTATE, POTASSIUM CHLORIDE, AND CALCIUM CHLORIDE 1000 ML: .6; .31; .03; .02 INJECTION, SOLUTION INTRAVENOUS at 02:10

## 2019-10-15 NOTE — DISCHARGE INSTRUCTIONS
Thank you for coming to our Emergency Department today. It is important to remember that some problems are difficult to diagnose and may not be found during your first visit. Be sure to follow up with your primary care doctor and review any labs/imaging that was performed with them. If you do not have a primary care doctor, you may contact the one listed on your discharge paperwork or you may also call the [facility] Appointment Desk at [phone] to schedule an appointment with one.     All medications may potentially have side effects and it is impossible to predict which medications may give you side effects. If you feel that you are having a negative effect of any medication you should immediately stop taking them and seek medical attention.    Return to the ER with any questions/concerns, new/concerning symptoms, worsening or failure to improve. Do not drive or make any important decisions for 24 hours if you have received any pain medications, sedatives or mood altering drugs during your ER visit.

## 2019-10-15 NOTE — ED PROVIDER NOTES
Encounter Date: 10/15/2019    This is a SORT/MSE of a 24 y.o. male presenting to the ED with c/o left sided testicle pain, began yesterday, worsening. Sent from clinic for evaluation. Care will be transferred to an alternate provider when patient is roomed for a full evaluation and final disposition. SAM Lawrence, FNP-C 10/15/2019 12:30 PM    SCRIBE #1 NOTE: I, El Fuentes, am scribing for, and in the presence of,  Dr. Will. I have scribed the following portions of the note - Other sections scribed: HPI, ROS , PE.       History     Chief Complaint   Patient presents with    Testicle Pain     Pt reports sudden onset of left testicular pain yesterday at work while lifting heavy objects. denies pain med use.      This is a 24 y.o. Male with history of HTN, anemia, tobacco smoking who presents to the ED complaining of sharp, left testicular pain that radiates to the LLQ of the abdomen that started at approximately 1030 yesterday. The patient was lifting weights for work when he felt a sharp, twinge in his groin. His pain is exacerbated with coughing and movement. The patient reports having testicular swelling, hot flashes (occurred yesterday, resolved), severe HA (alleved with an ibuprofen), and being sexually active. Pt denies testicular redness, penile discharge, hematuria, urinary retention, constipation, and drinking alcohol. His last bowel movement was diarrheal, but this is normal for him due to IBS. No hematochezia.        The history is provided by the patient. No  was used.     Review of patient's allergies indicates:  No Known Allergies  Past Medical History:   Diagnosis Date    Hypertension     IBS (irritable bowel syndrome)      No past surgical history on file.  Family History   Problem Relation Age of Onset    Heart disease Father     Diabetes Maternal Grandfather     Hypertension Maternal Grandfather     Drug abuse Paternal Grandmother      Social History     Tobacco Use     Smoking status: Current Every Day Smoker     Packs/day: 1.00     Types: Cigarettes   Substance Use Topics    Alcohol use: No    Drug use: No     Review of Systems   Constitutional: Negative for chills and fever.        Positive for hot flashes (resolved)   HENT: Negative for drooling and voice change.    Eyes: Negative for photophobia and visual disturbance.   Respiratory: Negative for shortness of breath and wheezing.    Cardiovascular: Negative for chest pain and leg swelling.   Gastrointestinal: Positive for abdominal pain (LLQ) and diarrhea. Negative for nausea and vomiting.   Genitourinary: Positive for scrotal swelling and testicular pain. Negative for discharge, dysuria, frequency, hematuria and urgency.   Musculoskeletal: Negative for myalgias and neck pain.   Skin: Negative for rash and wound.   Neurological: Positive for headaches (resolved). Negative for syncope, weakness and numbness.   Psychiatric/Behavioral: Negative for agitation and confusion.   All other systems reviewed and are negative.      Physical Exam     Initial Vitals [10/15/19 1232]   BP Pulse Resp Temp SpO2   (!) 120/58 75 18 98.3 °F (36.8 °C) 100 %      MAP       --         Physical Exam    Nursing note and vitals reviewed.  Constitutional: He appears well-developed and well-nourished. He is not diaphoretic. No distress.   HENT:   Head: Normocephalic and atraumatic.   Right Ear: External ear normal.   Left Ear: External ear normal.   Mouth/Throat: Oropharynx is clear and moist. No oropharyngeal exudate.   Moist mucous membranes   Eyes: Conjunctivae and EOM are normal. Pupils are equal, round, and reactive to light. Right eye exhibits no discharge. Left eye exhibits no discharge.   Neck: Normal range of motion. Neck supple. No JVD present.   Cardiovascular: Normal rate, regular rhythm, normal heart sounds and intact distal pulses. Exam reveals no gallop and no friction rub.    No murmur heard.  Pulmonary/Chest: Breath sounds normal.  No respiratory distress. He has no wheezes. He has no rhonchi. He has no rales.   Abdominal: Soft. Bowel sounds are normal. He exhibits no distension. There is tenderness (Mild left very lower quadrant tenderness to palpation) in the left lower quadrant. There is no rebound and no guarding. A hernia is present. Hernia confirmed positive in the left inguinal area.   No CVA tenderness, Jauregui test negative   Genitourinary: Cremasteric reflex is present. Cremasteric reflex is not absent on the right side. Left testis shows tenderness. Cremasteric reflex is not absent on the left side. Uncircumcised. No discharge found.   Genitourinary Comments: There is TTP to left inguinal hernia, small mass noted with coughing to the left inguinal region.  There is tenderness to left epididymal regionNo pnile d with vertical lie.  Cremasteric reflex intact. No discharge or overlying skin changes.     Musculoskeletal: Normal range of motion. He exhibits no edema or tenderness.   Lymphadenopathy:     He has no cervical adenopathy.   Neurological: He is alert and oriented to person, place, and time. No cranial nerve deficit. GCS score is 15. GCS eye subscore is 4. GCS verbal subscore is 5. GCS motor subscore is 6.   Skin: Skin is warm and dry. Capillary refill takes less than 2 seconds.   Psychiatric: He has a normal mood and affect. Thought content normal.         ED Course   Procedures  Labs Reviewed   COMPREHENSIVE METABOLIC PANEL - Abnormal; Notable for the following components:       Result Value    Anion Gap 5 (*)     All other components within normal limits   PROTIME-INR - Abnormal; Notable for the following components:    Prothrombin Time 12.7 (*)     All other components within normal limits   URINALYSIS, REFLEX TO URINE CULTURE - Abnormal; Notable for the following components:    Urobilinogen, UA 4.0-6.0 (*)     All other components within normal limits    Narrative:     Preferred Collection Type->Urine, Clean Catch   C.  TRACHOMATIS/N. GONORRHOEAE BY AMP DNA   CBC W/ AUTO DIFFERENTIAL   LIPASE   LACTIC ACID, PLASMA          Imaging Results          US Scrotum And Testicles (Final result)  Result time 10/15/19 13:56:48    Final result by Chandler Lino MD (10/15/19 13:56:48)                 Impression:      Findings most consistent with left epididymitis with small reactive hydrocele.  No findings to suggest orchitis at this time.    Small bilateral epididymal head cysts.      Electronically signed by: Chandler Lino MD  Date:    10/15/2019  Time:    13:56             Narrative:    EXAMINATION:  US SCROTUM AND TESTICLES    CLINICAL HISTORY:  Left testicular pain    TECHNIQUE:  Sonography of the scrotum and testes.    COMPARISON:  None.    FINDINGS:  The right testicle measures approximately 2.7 x 2.5 x 4.2 cm with a homogeneous echotexture peer there is a right epididymal head cyst measuring up to 0.5 cm.  No significant hyperemia within the right epididymis.  No right varicocele.  No right hydrocele.  Arterial and venous flow is documented to the right testicle without hyperemia.    The left testicle measures approximately 2.8 x 2.6 x 3.9 cm.  There is a small left hydrocele.  The left epididymal head is enlarged, and demonstrates hyperemia.  There are several left epididymal head cysts, largest measures up to 0.8 cm.  There is hyperemia of the left epididymal tail.  No significant hyperemia of the left testicle.  Arterial and venous flow is demonstrated to the left testicle.  No left varicocele.                              24 year old patient presenting 2/2 left testicular pain and left lower quadrant abdominal pain since yesterday. Patient is non toxic in appearance with normal vitals.  Patient was placed in reverse Trendelenburg and ice pack placed to inguinal region.  Concern for acute hydrocele, epididymitis, less likely testicular torsion, or possibly strangulated hernia.  A small mass was felt that the inguinal canal  with coughing however there is also epididymal tenderness.  His cremaster reflexes are intact his vertical lie of his testicles making testicular torsion less likely.  They did attempt to de torse in the office without success.    Also considered but less likely:     AAA: location inconsistent, no bruits, no history of HTN  Cholecystitis: location inconsistent, no relation with meals, negative worthys  SBO: normal BM and flatus. No vomiting  Appendicitis: location inconsistent, no fever, no rebound/guarding  Mesenteric ischemia: HPI inconsistent, does not coincide with meals, other dx more likely  Kidney stone: no radiation to back or cva tenderness, no dysuria, no hematuria  Pyelonephritis: no cva tenderness, no dysuria, no fever  Pancreatitis: no history of alcohol abuse, unlikely gallstone obstructing, location inconsistent  Diverticulitis: age and location not most common, no history of diverticulitis, no fever, no wbc  Epididymitis:  Epididymitis noted on ultrasound with small hydrocele  UTI: UA negative, no dysuria or increased frequency of urination  Testicular torsion:  Ultrasound without any signs of testicular torsion.  Patient was also given Dilaudid and upon return from ultrasound said his symptoms have completely resolved.  On exam he no longer has any swelling to the left testicular region.  Not able to feel hernia at this time.  Unsure if swelling improved secondary to ice and reverse Trendelenburg or if the possible hernia resolved itself.  Discussed with patient that if there was a small hernia in may return and if the pain is worsened and he is unable to reduce the hernia that he is return immediately to the ED.  Will refer him to General surgery 1 swelling improves for re-evaluation for hernia.  Also discussed wearing a jockstrap and protecting himself with any increase in intra-abdominal pressure until he is cleared from hernia perspective.  Although given patient's story this is most likely  secondary to trauma discussed with patient and partner that is possibly secondary to STDs.  I treated patient with ceftriaxone will send him home with doxycycline here and sent a gonorrhea chlamydia test.  I also recommended that his partner get tested and not have any sexual intercourse until they are both tested and treated if needed.  Patient agreement plan and is understanding he should return immediately if his pain returns.                    Scribe Attestation:   Scribe #1: I performed the above scribed service and the documentation accurately describes the services I performed. I attest to the accuracy of the note.               Clinical Impression:       ICD-10-CM ICD-9-CM   1. Epididymitis N45.1 604.90   2. Pain in left testicle N50.812 608.9   3. Left testicular pain N50.812 608.9   4. Hydrocele, unspecified hydrocele type N43.3 603.9   5. Inguinal hernia of left side without obstruction or gangrene K40.90 550.90                           Scribe attestation: I, Montserrat Will, personally performed the services described in this documentation. All medical record entries made by the scribe were at my direction and in my presence. I have reviewed the chart and agree that the record reflects my personal performance and is accurate and complete         Montserrat Will MD  10/15/19 9602

## 2019-10-15 NOTE — ED TRIAGE NOTES
C/o pain in groin/ scrotum region.  Lifts heavy objects and felt sharp pain in marjan and left side pain, pulled.  Pain radiates, worse when he coughs.  Can't  child. NKDA

## 2019-10-15 NOTE — LETTER
October 15, 2019      [facility] [address], [address]  [place] [address]  Phone: [phone]  Fax: [phone]       Patient: Galileo Johnson   YOB: 1995  Date of Visit: 10/15/2019    To Whom It May Concern:    Jarad Johnson  was at [facility] on 10/15/2019. He may return to work/school on 10/16/19 with no restrictions. If you have any questions or concerns, or if I can be of further assistance, please do not hesitate to contact me.    Sincerely,    Isidro Madrigal PA-C

## 2019-10-15 NOTE — PROGRESS NOTES
"Subjective:       Patient ID: Galileo Johnson is a 24 y.o. male.    Vitals:  temperature is 98.2 °F (36.8 °C). His blood pressure is 104/65 and his pulse is 52 (abnormal). His respiration is 18 and oxygen saturation is 100%.     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 States pain has gotten progressively worse since onset. Associated symptoms include abdominal pain, weakness and dizziness. Pain is aggravated by lifting/pulling, walking, and sitting in certain positions. Denies dysuria, urgency, penile discharge, N/V.       URI    This is a new problem. The current episode started yesterday. The problem has been gradually worsening. Associated symptoms include headaches. Pertinent negatives include no congestion, coughing, ear pain, nausea, rash, sinus pain, sore throat, vomiting or wheezing. He has tried NSAIDs for the symptoms. The treatment provided no relief.       Constitution: Positive for generalized weakness. Negative for chills, sweating, fatigue and fever.   HENT: Negative for ear pain, congestion, sinus pain, sinus pressure, sore throat and voice change.    Neck: Negative for painful lymph nodes.   Eyes: Negative for eye redness.   Respiratory: Negative for chest tightness, cough, sputum production, bloody sputum, COPD, shortness of breath, stridor, wheezing and asthma.    Gastrointestinal: Negative for nausea and vomiting.   Genitourinary: Positive for testicular pain.   Musculoskeletal: Negative for muscle ache.   Skin: Negative for rash.   Allergic/Immunologic: Negative for seasonal allergies and asthma.   Neurological: Positive for dizziness and headaches.   Hematologic/Lymphatic: Negative for swollen lymph nodes.       Objective:      Physical Exam   Constitutional: He is oriented to person, place, and time. He appears well-developed and well-nourished. He " is cooperative.  Non-toxic appearance. He does not appear ill. No distress.   HENT:   Head: Normocephalic and atraumatic.   Right Ear: Hearing, tympanic membrane, external ear and ear canal normal.   Left Ear: Hearing, tympanic membrane, external ear and ear canal normal.   Nose: Nose normal. No mucosal edema, rhinorrhea or nasal deformity. No epistaxis. Right sinus exhibits no maxillary sinus tenderness and no frontal sinus tenderness. Left sinus exhibits no maxillary sinus tenderness and no frontal sinus tenderness.   Mouth/Throat: Uvula is midline, oropharynx is clear and moist and mucous membranes are normal. No trismus in the jaw. Normal dentition. No uvula swelling. No posterior oropharyngeal erythema.   Eyes: Conjunctivae and lids are normal. Right eye exhibits no discharge. Left eye exhibits no discharge. No scleral icterus.   Neck: Trachea normal, normal range of motion, full passive range of motion without pain and phonation normal. Neck supple.   Cardiovascular: Normal rate, regular rhythm, normal heart sounds, intact distal pulses and normal pulses.   Pulmonary/Chest: Effort normal and breath sounds normal. No respiratory distress.   Abdominal: Soft. Normal appearance and bowel sounds are normal. He exhibits no distension, no abdominal bruit, no pulsatile midline mass and no mass. There is no tenderness. There is no rigidity, no rebound, no guarding and no CVA tenderness.   Genitourinary: Left testis shows swelling and tenderness.         Genitourinary Comments: Attempted manual detorsion, patient unable to tolerate due to pain; no improvement in pain with lifting of testicle.    Musculoskeletal: Normal range of motion. He exhibits no edema or deformity.   Neurological: He is alert and oriented to person, place, and time. He has normal strength. He exhibits normal muscle tone. Coordination normal.   Skin: Skin is warm, dry, intact, not diaphoretic and not pale.   Psychiatric: He has a normal mood and  affect. His speech is normal and behavior is normal. Judgment and thought content normal. Cognition and memory are normal.   Nursing note and vitals reviewed.        Results for orders placed or performed in visit on 10/15/19   POCT Influenza A/B   Result Value Ref Range    Rapid Influenza A Ag Negative Negative    Rapid Influenza B Ag Negative Negative     Acceptable Yes    POCT Urinalysis, Dipstick, Automated, W/O Scope   Result Value Ref Range    POC Blood, Urine Negative Negative    POC Bilirubin, Urine Negative Negative    POC Urobilinogen, Urine normal 0.3 - 2.2    POC Ketones, Urine Negative Negative    POC Protein, Urine Negative Negative    POC Nitrates, Urine Negative Negative    POC Glucose, Urine Negative Negative    pH, UA 6.5 5 - 8    POC Specific Gravity, Urine 1.020 1.003 - 1.029    POC Leukocytes, Urine Negative Negative     Concerned for possible testicular torsion vs strangulated hernia. Patient needs further evaluation in the ER setting (U/S) to r/o emergent conditions of the testical.   Discussed patient with Dr. Marley, who advised to attempt manual detorsion in clinic and refer to ER for emergent evaluation to r/o testicular torsion vs strangulated hernia vs acute hydrocele vs epididymitis.       Assessment:       1. Testicular pain, left    2. Weakness        Plan:         Testicular pain, left  -     POCT Urinalysis, Dipstick, Automated, W/O Scope  -     Refer to Emergency Dept.    Weakness  -     POCT Influenza A/B      Patient Instructions     ER Referral   Your condition is serious and requires immediate attention and evaluation in an ER setting.  You were referred to [facility] on [address]    GO STRAIGHT TO THE ER AND DO NOT EAT OR DRINK ANYTHING UNLESS A HEALTHCARE PROVIDER GIVES IT TO YOU.             Testicular Pain, Unclear Cause  You have had pain in one or both testicles. Based on your exam today, the exact cause of your pain is not certain. But your condition  does not appear to be dangerous. Testicles are very sensitive. Even a small injury can cause quite a bit of pain. Other possible causes of testicular pain include kidney stones, cysts, mumps, inflammatory conditions, chronic conditions, hernia, infection, and a twisted testicle.  Certain tests may be done to rule out an underlying problem causing the pain. Nothing conclusive was found today. Most likely, the pain will go away on its own. If it doesnt, you may need more tests.    Home care  Medicine may be prescribed to help relieve pain and swelling. This may be an over-the-counter pain reliever or prescription pain medication. Take all medicine as directed.  The following are general care guidelines:  · To relieve pain and swelling, apply an ice pack wrapped in a thin towel for 10 minutes at a time. Continue this on and off for 1 to 2 days.  · When lying down, place a small rolled towel under your scrotum. When moving around, wear a jockstrap (athletic supporter) or supportive underwear. These will help support and protect your testicles.  · If it hurts to walk, walk as little as possible until you feel better.  · Avoid strenuous activity until you feel better.  · Do not have sex until you feel better.  · If you have severe pain in the testicle, seek care right away. Delay may lead to permanent loss of the testicles function.  Follow-up care  Follow up with your healthcare provider, or as advised.  When to seek medical advice  Call your healthcare provider right away if any of these occur:  · Fever of 100.4°F (38°C) or higher  · Worsening of the pain or severe pain  · Swelling of the testicle or scrotum  · A lump in the scrotum  · Warm and red scrotum (signs of infection)  · Nausea and vomiting  · Pain or swelling in abdomen  · Trouble urinating  · Numbness or weakness in the leg  · Shrinking of the testicle  · Blood in your urine  Date Last Reviewed: 10/1/2016  © 7380-1498 [employer]. [address] [place] [address]  [place] [address]. All rights reserved. This information is not intended as a substitute for professional medical care. Always follow your healthcare professional's instructions.

## 2019-10-15 NOTE — PATIENT INSTRUCTIONS
ER Referral   Your condition is serious and requires immediate attention and evaluation in an ER setting.  You were referred to [facility] on [address]    GO STRAIGHT TO THE ER AND DO NOT EAT OR DRINK ANYTHING UNLESS A HEALTHCARE PROVIDER GIVES IT TO YOU.             Testicular Pain, Unclear Cause  You have had pain in one or both testicles. Based on your exam today, the exact cause of your pain is not certain. But your condition does not appear to be dangerous. Testicles are very sensitive. Even a small injury can cause quite a bit of pain. Other possible causes of testicular pain include kidney stones, cysts, mumps, inflammatory conditions, chronic conditions, hernia, infection, and a twisted testicle.  Certain tests may be done to rule out an underlying problem causing the pain. Nothing conclusive was found today. Most likely, the pain will go away on its own. If it doesnt, you may need more tests.    Home care  Medicine may be prescribed to help relieve pain and swelling. This may be an over-the-counter pain reliever or prescription pain medication. Take all medicine as directed.  The following are general care guidelines:  · To relieve pain and swelling, apply an ice pack wrapped in a thin towel for 10 minutes at a time. Continue this on and off for 1 to 2 days.  · When lying down, place a small rolled towel under your scrotum. When moving around, wear a jockstrap (athletic supporter) or supportive underwear. These will help support and protect your testicles.  · If it hurts to walk, walk as little as possible until you feel better.  · Avoid strenuous activity until you feel better.  · Do not have sex until you feel better.  · If you have severe pain in the testicle, seek care right away. Delay may lead to permanent loss of the testicles function.  Follow-up care  Follow up with your healthcare provider, or as advised.  When to seek medical advice  Call your healthcare provider right away if any of  these occur:  · Fever of 100.4°F (38°C) or higher  · Worsening of the pain or severe pain  · Swelling of the testicle or scrotum  · A lump in the scrotum  · Warm and red scrotum (signs of infection)  · Nausea and vomiting  · Pain or swelling in abdomen  · Trouble urinating  · Numbness or weakness in the leg  · Shrinking of the testicle  · Blood in your urine  Date Last Reviewed: 10/1/2016  © 1827-7591 [employer]. [address] [place] [address]. All rights reserved. This information is not intended as a substitute for professional medical care. Always follow your healthcare professional's instructions.

## 2019-10-16 LAB
C TRACH DNA SPEC QL NAA+PROBE: NOT DETECTED
N GONORRHOEA DNA SPEC QL NAA+PROBE: NOT DETECTED

## 2019-10-19 ENCOUNTER — HOSPITAL ENCOUNTER (EMERGENCY)
Facility: HOSPITAL | Age: 24
Discharge: HOME | End: 2019-10-19
Attending: EMERGENCY MEDICINE
Payer: MEDICAID

## 2019-10-19 VITALS
HEIGHT: 73 IN | DIASTOLIC BLOOD PRESSURE: 57 MMHG | SYSTOLIC BLOOD PRESSURE: 107 MMHG | OXYGEN SATURATION: 100 % | HEART RATE: 65 BPM | RESPIRATION RATE: 18 BRPM | BODY MASS INDEX: 17.23 KG/M2 | WEIGHT: 130 LBS | TEMPERATURE: 99 F

## 2019-10-19 DIAGNOSIS — S39.011A ABDOMINAL WALL STRAIN, INITIAL ENCOUNTER: Primary | ICD-10-CM

## 2019-10-19 DIAGNOSIS — M79.18 MUSCULAR ABDOMINAL PAIN IN LEFT FLANK: ICD-10-CM

## 2019-10-19 LAB
ALBUMIN SERPL BCP-MCNC: 3.9 G/DL (ref 3.5–5.2)
ALP SERPL-CCNC: 72 U/L (ref 55–135)
ALT SERPL W/O P-5'-P-CCNC: 9 U/L (ref 10–44)
ANION GAP SERPL CALC-SCNC: 8 MMOL/L (ref 8–16)
AST SERPL-CCNC: 18 U/L (ref 10–40)
BASOPHILS # BLD AUTO: 0.02 K/UL (ref 0–0.2)
BASOPHILS NFR BLD: 0.2 % (ref 0–1.9)
BILIRUB SERPL-MCNC: 0.8 MG/DL (ref 0.1–1)
BILIRUB UR QL STRIP: NEGATIVE
BUN SERPL-MCNC: 10 MG/DL (ref 6–20)
CALCIUM SERPL-MCNC: 8.9 MG/DL (ref 8.7–10.5)
CHLORIDE SERPL-SCNC: 100 MMOL/L (ref 95–110)
CLARITY UR: CLEAR
CO2 SERPL-SCNC: 29 MMOL/L (ref 23–29)
COLOR UR: YELLOW
CREAT SERPL-MCNC: 1.1 MG/DL (ref 0.5–1.4)
DIFFERENTIAL METHOD: ABNORMAL
EOSINOPHIL # BLD AUTO: 0 K/UL (ref 0–0.5)
EOSINOPHIL NFR BLD: 0.1 % (ref 0–8)
ERYTHROCYTE [DISTWIDTH] IN BLOOD BY AUTOMATED COUNT: 12.5 % (ref 11.5–14.5)
EST. GFR  (AFRICAN AMERICAN): >60 ML/MIN/1.73 M^2
EST. GFR  (NON AFRICAN AMERICAN): >60 ML/MIN/1.73 M^2
GLUCOSE SERPL-MCNC: 111 MG/DL (ref 70–110)
GLUCOSE UR QL STRIP: NEGATIVE
HCT VFR BLD AUTO: 43.4 % (ref 40–54)
HGB BLD-MCNC: 15.1 G/DL (ref 14–18)
HGB UR QL STRIP: NEGATIVE
IMM GRANULOCYTES # BLD AUTO: 0.03 K/UL (ref 0–0.04)
IMM GRANULOCYTES NFR BLD AUTO: 0.3 % (ref 0–0.5)
KETONES UR QL STRIP: NEGATIVE
LEUKOCYTE ESTERASE UR QL STRIP: NEGATIVE
LIPASE SERPL-CCNC: 3 U/L (ref 4–60)
LYMPHOCYTES # BLD AUTO: 1.3 K/UL (ref 1–4.8)
LYMPHOCYTES NFR BLD: 15.2 % (ref 18–48)
MCH RBC QN AUTO: 30.4 PG (ref 27–31)
MCHC RBC AUTO-ENTMCNC: 34.8 G/DL (ref 32–36)
MCV RBC AUTO: 87 FL (ref 82–98)
MONOCYTES # BLD AUTO: 0.4 K/UL (ref 0.3–1)
MONOCYTES NFR BLD: 4.8 % (ref 4–15)
NEUTROPHILS # BLD AUTO: 7 K/UL (ref 1.8–7.7)
NEUTROPHILS NFR BLD: 79.4 % (ref 38–73)
NITRITE UR QL STRIP: NEGATIVE
NRBC BLD-RTO: 0 /100 WBC
PH UR STRIP: 6 [PH] (ref 5–8)
PLATELET # BLD AUTO: 139 K/UL (ref 150–350)
PMV BLD AUTO: 9.6 FL (ref 9.2–12.9)
POTASSIUM SERPL-SCNC: 4.1 MMOL/L (ref 3.5–5.1)
PROT SERPL-MCNC: 7 G/DL (ref 6–8.4)
PROT UR QL STRIP: NEGATIVE
RBC # BLD AUTO: 4.97 M/UL (ref 4.6–6.2)
SODIUM SERPL-SCNC: 137 MMOL/L (ref 136–145)
SP GR UR STRIP: 1.01 (ref 1–1.03)
URN SPEC COLLECT METH UR: NORMAL
UROBILINOGEN UR STRIP-ACNC: NEGATIVE EU/DL
WBC # BLD AUTO: 8.77 K/UL (ref 3.9–12.7)

## 2019-10-19 PROCEDURE — 25500020 PHARM REV CODE 255: Performed by: EMERGENCY MEDICINE

## 2019-10-19 PROCEDURE — 96374 THER/PROPH/DIAG INJ IV PUSH: CPT

## 2019-10-19 PROCEDURE — 85025 COMPLETE CBC W/AUTO DIFF WBC: CPT

## 2019-10-19 PROCEDURE — 80053 COMPREHEN METABOLIC PANEL: CPT

## 2019-10-19 PROCEDURE — 99285 EMERGENCY DEPT VISIT HI MDM: CPT | Mod: 25

## 2019-10-19 PROCEDURE — 25000003 PHARM REV CODE 250: Performed by: EMERGENCY MEDICINE

## 2019-10-19 PROCEDURE — 81003 URINALYSIS AUTO W/O SCOPE: CPT

## 2019-10-19 PROCEDURE — 83690 ASSAY OF LIPASE: CPT

## 2019-10-19 PROCEDURE — 96375 TX/PRO/DX INJ NEW DRUG ADDON: CPT

## 2019-10-19 PROCEDURE — 96361 HYDRATE IV INFUSION ADD-ON: CPT

## 2019-10-19 PROCEDURE — 63600175 PHARM REV CODE 636 W HCPCS: Performed by: EMERGENCY MEDICINE

## 2019-10-19 RX ORDER — IBUPROFEN 600 MG/1
600 TABLET ORAL EVERY 6 HOURS PRN
Qty: 20 TABLET | Refills: 0 | Status: SHIPPED | OUTPATIENT
Start: 2019-10-19 | End: 2021-06-03

## 2019-10-19 RX ORDER — HYDROMORPHONE HYDROCHLORIDE 2 MG/ML
1 INJECTION, SOLUTION INTRAMUSCULAR; INTRAVENOUS; SUBCUTANEOUS
Status: COMPLETED | OUTPATIENT
Start: 2019-10-19 | End: 2019-10-19

## 2019-10-19 RX ORDER — KETOROLAC TROMETHAMINE 30 MG/ML
30 INJECTION, SOLUTION INTRAMUSCULAR; INTRAVENOUS
Status: COMPLETED | OUTPATIENT
Start: 2019-10-19 | End: 2019-10-19

## 2019-10-19 RX ORDER — ONDANSETRON 4 MG/1
4 TABLET, ORALLY DISINTEGRATING ORAL
Status: COMPLETED | OUTPATIENT
Start: 2019-10-19 | End: 2019-10-19

## 2019-10-19 RX ORDER — TRAMADOL HYDROCHLORIDE 50 MG/1
50 TABLET ORAL EVERY 6 HOURS PRN
Qty: 10 TABLET | Refills: 0 | Status: SHIPPED | OUTPATIENT
Start: 2019-10-19 | End: 2019-10-29

## 2019-10-19 RX ADMIN — KETOROLAC TROMETHAMINE 30 MG: 30 INJECTION, SOLUTION INTRAMUSCULAR at 04:10

## 2019-10-19 RX ADMIN — HYDROMORPHONE HYDROCHLORIDE 1 MG: 2 INJECTION, SOLUTION INTRAMUSCULAR; INTRAVENOUS; SUBCUTANEOUS at 02:10

## 2019-10-19 RX ADMIN — SODIUM CHLORIDE 1000 ML: 0.9 INJECTION, SOLUTION INTRAVENOUS at 02:10

## 2019-10-19 RX ADMIN — SODIUM CHLORIDE 1000 ML: 0.9 INJECTION, SOLUTION INTRAVENOUS at 03:10

## 2019-10-19 RX ADMIN — ONDANSETRON 4 MG: 4 TABLET, ORALLY DISINTEGRATING ORAL at 02:10

## 2019-10-19 RX ADMIN — IOHEXOL 75 ML: 350 INJECTION, SOLUTION INTRAVENOUS at 03:10

## 2019-10-19 NOTE — ED PROVIDER NOTES
Encounter Date: 10/19/2019    SCRIBE #1 NOTE: I, Albania Bejarano, am scribing for, and in the presence of,  Dr. Aguirre. I have scribed the following portions of the note - Other sections scribed: HPI, ROS.       History     Chief Complaint   Patient presents with    Inguinal Hernia     Pt reports was dx with hernia x1 week ago and was sent home with pain meds. denies relief from medication.      Time of initial assessment: 13:49    CC: Inguinal Pain    HPI:  This is a 24 y.o. Male, with a PMHx of HTN and IBS, who presents to the Emergency Department with a cc of a left inguinal pain x5 days. He describes the sensation as an unbearable, pulling/ripping pain that progressively worsened and radiates to his LLQ abdomen and left flank. Pain is worsened by movement. Pt states that he was seen, here in the ED, 4 days ago for the same symptoms and diagnosed with a left inguinal hernia. However, the antibiotic he was prescribed provided no relief. He denies referral to a general surgeon or urologist. By Thursday, the pain was so severe that he was unable to walk properly. Today, in the ED, he reports associated chills, HA, brown penile discharge, and diarrhea. However, he states that he has not had a bowel movement since yesterday. He denies vomiting, dysuria, CP, cough, SOB, ear pain, eye pain, sore throat, nausea, rashes, and arm/leg pain. No known allergies. No pertinent PSHx. Pt reports use of tobacco and marijuana but denies consumption of alcohol.     The history is provided by the patient. No  was used.     Review of patient's allergies indicates:  No Known Allergies  Past Medical History:   Diagnosis Date    Hypertension     IBS (irritable bowel syndrome)      History reviewed. No pertinent surgical history.  Family History   Problem Relation Age of Onset    Heart disease Father     Diabetes Maternal Grandfather     Hypertension Maternal Grandfather     Drug abuse Paternal Grandmother       Social History     Tobacco Use    Smoking status: Current Every Day Smoker     Packs/day: 2.00     Types: Cigarettes    Smokeless tobacco: Never Used   Substance Use Topics    Alcohol use: Not Currently    Drug use: Yes     Types: Marijuana     Review of Systems   Constitutional: Positive for chills.   HENT: Negative for ear pain and sore throat.    Eyes: Negative for pain.   Respiratory: Negative for cough and shortness of breath.    Cardiovascular: Negative for chest pain.   Gastrointestinal: Positive for abdominal pain and diarrhea. Negative for nausea and vomiting.   Genitourinary: Positive for discharge, flank pain, genital sores and testicular pain. Negative for dysuria.   Musculoskeletal:        (-) arm/ leg pain   Skin: Negative for rash.   Neurological: Positive for headaches.       Physical Exam     Initial Vitals [10/19/19 1312]   BP Pulse Resp Temp SpO2   (!) 102/56 73 18 98.8 °F (37.1 °C) 100 %      MAP       --         Physical Exam  The patient was examined specifically for the following:   General:No significant distress, Good color, Warm and dry. Head and neck:Scalp atraumatic, Neck supple. Neurological:Appropriate conversation, Gross motor deficits. Eyes:Conjugate gaze, Clear corneas. ENT: No epistaxis. Cardiac: Regular rate and rhythm, Grossly normal heart tones. Pulmonary: Wheezing, Rales. Gastrointestinal: Abdominal tenderness, Abdominal distention. Musculoskeletal: Extremity deformity, Apparent pain with range of motion of the joints. Skin: Rash.   The findings on examination were normal except for the following:  The patient has tenderness of the left testicle left epididymis.  There is no penile discharge. He is an uncircumcised male.  There is tenderness of the left inguinal region the left lower quadrant and the left flank.  The patient is splinting his left flank.  The patient's pain is markedly worse with movement during the physical examination. No inguinal hernia is identified,  with the patient examined standing up.   ED Course   Procedures  Labs Reviewed   COMPREHENSIVE METABOLIC PANEL - Abnormal; Notable for the following components:       Result Value    Glucose 111 (*)     ALT 9 (*)     All other components within normal limits   CBC W/ AUTO DIFFERENTIAL - Abnormal; Notable for the following components:    Platelets 139 (*)     Gran% 79.4 (*)     Lymph% 15.2 (*)     All other components within normal limits   LIPASE - Abnormal; Notable for the following components:    Lipase 3 (*)     All other components within normal limits   URINALYSIS, REFLEX TO URINE CULTURE    Narrative:     Preferred Collection Type->Urine, Clean Catch          Imaging Results           CT Abdomen Pelvis With Contrast (Final result)  Result time 10/19/19 16:13:32    Final result by Chandler Lino MD (10/19/19 16:13:32)                 Impression:      This report was flagged in Epic as abnormal.    1. Urinary bladder wall thickening, possibly on the basis of nondistention however correlation with urinalysis is recommended.  2. There is a small amount of free fluid in the pelvis, abnormal in a male patient.  This may reflect reactive fluid.  3. Moderate to large amount of stool in the right colon, some of which is of liquid content, diarrheal illness is a consideration.  There are a few fluid-filled small bowel loops in the lower pelvis, without significant dilation or wall thickening.  Early enteritis is a consideration.  4. Hepatic hypo attenuation may be on the basis of contrast phase however correlation with urinalysis is advised.  5. Additional findings above.      Electronically signed by: Chandler Lino MD  Date:    10/19/2019  Time:    16:13             Narrative:    EXAMINATION:  CT ABDOMEN PELVIS WITH CONTRAST    CLINICAL HISTORY:  Infection, abdomen-pelvis;    TECHNIQUE:  Low dose axial images, sagittal and coronal reformations were obtained from the lung bases to the pubic symphysis following the  IV administration of 75 mL of Omnipaque 350 .  Oral contrast was not given.    COMPARISON:  None.    FINDINGS:  Images of the lower thorax are grossly unremarkable.    The liver is hypoattenuating, may reflect phase of contrast however correlation with LFTs is recommended as this could reflect steatosis.  The spleen, pancreas, gallbladder and adrenal glands are grossly unremarkable.  There is no biliary dilation or ascites.  The portal vein, splenic vein, proximal SMV, celiac axis and SMA all are patent.  No significant abdominal lymphadenopathy.    The kidneys enhance symmetrically without hydronephrosis or nephrolithiasis.  The bilateral ureters are unable to be followed in their entirety to the urinary bladder, no definite calculi seen.  The urinary bladder is decompressed, may account for wall thickening, correlation with urinalysis however is advised.  The prostate is not enlarged.    There is a small amount of free fluid in the deep pelvis.  There are several scattered colonic diverticula without surrounding inflammation to suggest diverticulitis.  The terminal ileum is grossly unremarkable.  The appendix is unremarkable.  There is some liquid stool within the cecum.  There are a few fluid-filled distal small bowel loops without wall thickening.  No convincing focal organized pelvic fluid collection.  No focal osseous destructive process.  No significant inguinal lymphadenopathy.    No findings to suggest inguinal hernia.                              Medical decision making:  Given the above, this patient presents to the emergency with left flank left lower quadrant left inguinal and left testicular pain.  The patient is tender throughout.  He is splinting his lumbar back and abdomen.  The patient had onset of his symptoms while heavy lifting.  I find no evidence of inguinal hernia.  I believe it is musculoskeletal strain.  I will treat with tramadol and ibuprofen.  I will have the patient return if he gets  worse or if new problems develop.  His urine was negative for gonorrhea and chlamydia.  I will have the patient follow up with General surgery.                Scribe Attestation:   Scribe #1: I performed the above scribed service and the documentation accurately describes the services I performed. I attest to the accuracy of the note.    Scribe attestation: I, Dr. Aguirre, personally performed the services described in this documentation. All medical record entries made by the scribe were at my direction and in my presence.  I have reviewed the chart and agree that the record reflects my personal performance and is accurate and complete.           Clinical Impression:     1. Abdominal wall strain, initial encounter    2. Muscular abdominal pain in left flank                                 Luc Aguirre MD  10/19/19 9470

## 2019-10-19 NOTE — DISCHARGE INSTRUCTIONS
Please return immediately if you get worse or if new problems develop.  Please use a heating pad.  Please follow-up with the general surgeon above this week.  Rest.  Light duty at work.  No heavy lifting.

## 2019-10-19 NOTE — ED TRIAGE NOTES
24 y.o. Male presents to the ED with chief complaint of inguinal hernia. Pt states on Monday he lifted something at work which caused the hernia on the left side. Pt states he come to the ED on Monday but the provider did not completely reduce the hernia. Pt states the provider pushed on it a little. Pt states he has been taking his antibiotics that were prescribed. Pt states the pain has been getting worse on his left abdomen and back. Pt states the tenderness of the hernia has been getting worse. Pt rates pain 10/10. AAOx4, NAD.

## 2019-10-23 ENCOUNTER — TELEPHONE (OUTPATIENT)
Dept: UROLOGY | Facility: CLINIC | Age: 24
End: 2019-10-23

## 2019-10-23 NOTE — TELEPHONE ENCOUNTER
Spoke to sheryl at 's office I advised unfortunately pt is not in network so we will be unable to see him.-alvaro

## 2020-05-04 ENCOUNTER — OFFICE VISIT (OUTPATIENT)
Dept: URGENT CARE | Facility: CLINIC | Age: 25
End: 2020-05-04
Payer: MEDICAID

## 2020-05-04 VITALS
HEIGHT: 73 IN | TEMPERATURE: 98 F | OXYGEN SATURATION: 98 % | RESPIRATION RATE: 18 BRPM | BODY MASS INDEX: 17.23 KG/M2 | HEART RATE: 67 BPM | WEIGHT: 130 LBS

## 2020-05-04 DIAGNOSIS — B96.89 SINUSITIS, BACTERIAL: Primary | ICD-10-CM

## 2020-05-04 DIAGNOSIS — J32.9 SINUSITIS, BACTERIAL: Primary | ICD-10-CM

## 2020-05-04 DIAGNOSIS — R05.9 COUGH: ICD-10-CM

## 2020-05-04 DIAGNOSIS — J40 BRONCHITIS: ICD-10-CM

## 2020-05-04 PROCEDURE — 99214 OFFICE O/P EST MOD 30 MIN: CPT | Mod: S$GLB,,, | Performed by: PHYSICIAN ASSISTANT

## 2020-05-04 PROCEDURE — 99214 PR OFFICE/OUTPT VISIT, EST, LEVL IV, 30-39 MIN: ICD-10-PCS | Mod: S$GLB,,, | Performed by: PHYSICIAN ASSISTANT

## 2020-05-04 PROCEDURE — 71046 XR CHEST PA AND LATERAL: ICD-10-PCS | Mod: S$GLB,,, | Performed by: RADIOLOGY

## 2020-05-04 PROCEDURE — 71046 X-RAY EXAM CHEST 2 VIEWS: CPT | Mod: S$GLB,,, | Performed by: RADIOLOGY

## 2020-05-04 RX ORDER — BENZONATATE 100 MG/1
100 CAPSULE ORAL 3 TIMES DAILY PRN
Qty: 20 CAPSULE | Refills: 0 | Status: SHIPPED | OUTPATIENT
Start: 2020-05-04 | End: 2020-06-03

## 2020-05-04 RX ORDER — ALBUTEROL SULFATE 90 UG/1
1-2 AEROSOL, METERED RESPIRATORY (INHALATION) EVERY 4 HOURS PRN
Qty: 18 G | Refills: 0 | Status: SHIPPED | OUTPATIENT
Start: 2020-05-04 | End: 2020-06-03

## 2020-05-04 RX ORDER — AMOXICILLIN AND CLAVULANATE POTASSIUM 875; 125 MG/1; MG/1
1 TABLET, FILM COATED ORAL 2 TIMES DAILY
Qty: 20 TABLET | Refills: 0 | Status: SHIPPED | OUTPATIENT
Start: 2020-05-04 | End: 2020-05-14

## 2020-05-04 RX ORDER — FLUTICASONE PROPIONATE 50 MCG
1 SPRAY, SUSPENSION (ML) NASAL DAILY
Qty: 15 G | Refills: 0 | Status: SHIPPED | OUTPATIENT
Start: 2020-05-04 | End: 2020-05-11

## 2020-05-04 NOTE — LETTER
May 4, 2020      [facility] [address], [address]  [place] [address]  Phone: [phone]  Fax: [phone]       Patient: Galileo Johnson   YOB: 1995  Date of Visit: 05/04/2020    To Whom It May Concern:    Jarad Johnson  was at [facility] on 05/04/2020. He may return to work/school on 05/07/2020 with no restrictions. If you have any questions or concerns, or if I can be of further assistance, please do not hesitate to contact me.    Sincerely,        Mia Liang PA-C

## 2020-05-04 NOTE — PATIENT INSTRUCTIONS
- Rest.  - Drink plenty of fluids.  - Take Tylenol and/or Ibuprofen as directed as needed for fever/pain.  Do not take more than the recommended dose.  - follow up with your PCP within the next 1-2 weeks as needed.  - Take over-the-counter claritin, zyrtec, allegra, or xyzal as directed.  You should NOT use a decongestant form (D) of this medication if you have a history of hypertension or heart disease.  - Use over the counter Flonase as directed for sinus congestion and postnasal drip.  - use nasal saline prior to Flonase.  - stop using Flonase if you developed nosebleeds.  - Use Ocean Spray Nasal Saline 1-3 puffs each nostril every 2-3 hours then blow out onto tissue. This is to irrigate the nasal passage way to clear the sinus openings. Use until sinus problem resolved.  - You must understand that you have received an Urgent Care treatment only and that you may be released before all of your medical problems are known or treated.   - You, the patient, will arrange for follow up care as instructed.   - If your condition worsens or fails to improve we recommend that you receive another evaluation at the ER immediately or contact your PCP to discuss your concerns.   - You can call [phone] or [phone] to help schedule an appointment with the appropriate provider.      Sinusitis (Antibiotic Treatment)    The sinuses are air-filled spaces within the bones of the face. They connect to the inside of the nose. Sinusitis is an inflammation of the tissue lining the sinus cavity. Sinus inflammation can occur during a cold. It can also be due to allergies to pollens and other particles in the air. Sinusitis can cause symptoms of sinus congestion and fullness. A sinus infection causes fever, headache and facial pain. There is often green or yellow drainage from the nose or into the back of the throat (post-nasal drip). You have been given antibiotics to treat this condition.  Home care:  · Take the full course of  antibiotics as instructed. Do not stop taking them, even if you feel better.  · Drink plenty of water, hot tea, and other liquids. This may help thin mucus. It also may promote sinus drainage.  · Heat may help soothe painful areas of the face. Use a towel soaked in hot water. Or,  the shower and direct the hot spray onto your face. Using a vaporizer along with a menthol rub at night may also help.   · An expectorant containing guaifenesin may help thin the mucus and promote drainage from the sinuses.  · Over-the-counter decongestants may be used unless a similar medicine was prescribed. Nasal sprays work the fastest. Use one that contains phenylephrine or oxymetazoline. First blow the nose gently. Then use the spray. Do not use these medicines more often than directed on the label or symptoms may get worse. You may also use tablets containing pseudoephedrine. Avoid products that combine ingredients, because side effects may be increased. Read labels. You can also ask the pharmacist for help. (NOTE: Persons with high blood pressure should not use decongestants. They can raise blood pressure.)  · Over-the-counter antihistamines may help if allergies contributed to your sinusitis.    · Do not use nasal rinses or irrigation during an acute sinus infection, unless told to by your health care provider. Rinsing may spread the infection to other sinuses.  · Use acetaminophen or ibuprofen to control pain, unless another pain medicine was prescribed. (If you have chronic liver or kidney disease or ever had a stomach ulcer, talk with your doctor before using these medicines. Aspirin should never be used in anyone under 18 years of age who is ill with a fever. It may cause severe liver damage.)  · Don't smoke. This can worsen symptoms.  Follow-up care  Follow up with your healthcare provider or our staff if you are not improving within the next week.  When to seek medical advice  Call your healthcare provider if any of  these occur:  · Facial pain or headache becoming more severe  · Stiff neck  · Unusual drowsiness or confusion  · Swelling of the forehead or eyelids  · Vision problems, including blurred or double vision  · Fever of 100.4ºF (38ºC) or higher, or as directed by your healthcare provider  · Seizure  · Breathing problems  · Symptoms not resolving within 10 days  Date Last Reviewed: 4/13/2015  © 3930-6810 [employer]. [address] [place] [address], [place] [address]. All rights reserved. This information is not intended as a substitute for professional medical care. Always follow your healthcare professional's instructions.        Bronchitis with Wheezing (Viral or Bacterial: Adult)    Bronchitis is an infection of the air passages. It often occurs during a cold and is usually caused by a virus. Symptoms include cough with mucus (phlegm) and low-grade fever. This illness is contagious during the first few days and is spread through the air by coughing and sneezing, or by direct contact (touching the sick person and then touching your own eyes, nose, or mouth).  If there is a lot of inflammation, air flow is restricted. The air passages may also go into spasm, especially if you have asthma. This causes wheezing and difficulty breathing even in people who do not have asthma.  Bronchitis usually lasts 7 to 14 days. The wheezing should improve with treatment during the first week. An inhaler is often prescribed to relax the air passages and stop wheezing. Antibiotics will be prescribed if your doctor thinks there is also a secondary bacterial infection.  Home care  · If symptoms are severe, rest at home for the first 2 to 3 days. When you go back to your usual activities, don't let yourself get too tired.  · Do not smoke. Also avoid being exposed to secondhand smoke.  · You may use over-the-counter medicine to control fever or pain, unless another medicine was prescribed. Note: If you have chronic liver or kidney  disease or have ever had a stomach ulcer or gastrointestinal bleeding, talk with your healthcare provider before using these medicines. Also talk to your provider if you are taking medicine to prevent blood clots.) Aspirin should never be given to anyone younger than 18 years of age who is ill with a viral infection or fever. It may cause severe liver or brain damage.  · Your appetite may be poor, so a light diet is fine. Avoid dehydration by drinking 6 to 8 glasses of fluids per day (such as water, soft drinks, sports drinks, juices, tea, or soup). Extra fluids will help loosen secretions in the nose and lungs.  · Over-the-counter cough, cold, and sore-throat medicines will not shorten the length of the illness, but they may be helpful to reduce symptoms. (Note: Do not use decongestants if you have high blood pressure.)  · If you were given an inhaler, use it exactly as directed. If you need to use it more often than prescribed, your condition may be worsening. If this happens, contact your healthcare provider.  · If prescribed, finish all antibiotic medicine, even if you are feeling better after only a few days.  Follow-up care  Follow up with your healthcare provider, or as advised. If you had an X-ray or ECG (electrocardiogram), a specialist will review it. You will be notified of any new findings that may affect your care.  Note: If you are age 65 or older, or if you have a chronic lung disease or condition that affects your immune system, or you smoke, talk to your healthcare provider about having a pneumococcal vaccinations and a yearly influenza vaccination (flu shot).  When to seek medical advice  Call your healthcare provider right away if any of these occur:  · Fever of 100.4°F (38°C) or higher  · Coughing up increasing amounts of colored sputum  · Weakness, drowsiness, headache, facial pain, ear pain, or a stiff neck  Call 911, or get immediate medical care  Contact emergency services right away if any  of these occur.  · Coughing up blood  · Worsening weakness, drowsiness, headache, or stiff neck  · Increased wheezing not helped with medication, shortness of breath, or pain with breathing  Date Last Reviewed: 9/13/2015  © 8075-9938 [employer]. [address] [place] [address], [place] [address]. All rights reserved. This information is not intended as a substitute for professional medical care. Always follow your healthcare professional's instructions.

## 2020-05-04 NOTE — PROGRESS NOTES
"Subjective:       Patient ID: Galileo Johnson is a 25 y.o. male.    Vitals:  height is 6' 1" (1.854 m) and weight is 59 kg (130 lb). His temperature is 97.9 °F (36.6 °C). His pulse is 67. His respiration is 18 and oxygen saturation is 98%.     Chief Complaint: Sinus Problem    This is a 25 y.o. male   with Past Medical History:  No date: IBS (irritable bowel syndrome)   and History reviewed. No pertinent surgical history.  who presents today with a chief complaint of productive cough, sinus pressure, sore throat and low grade fever that began 10 days ago. Pt was tested for covid on 4-27-20 at the [facility] and was negative.  He has had pneumonia twice over the past 2-3 years.  He has been taking an OTC "sinus" medication.      Sinus Problem   This is a new problem. The current episode started in the past 7 days. The problem is unchanged. There has been no fever. His pain is at a severity of 6/10. The pain is moderate. Associated symptoms include congestion, coughing, sinus pressure and a sore throat. Pertinent negatives include no chills, diaphoresis, ear pain or shortness of breath. Treatments tried: sinus med. The treatment provided mild relief.       Constitution: Negative for chills, sweating, fatigue and fever.   HENT: Positive for congestion, sinus pressure and sore throat. Negative for ear pain, sinus pain and voice change.    Neck: Negative for painful lymph nodes.   Eyes: Negative for eye redness.   Respiratory: Positive for cough and sputum production. Negative for chest tightness, bloody sputum, COPD, shortness of breath, stridor, wheezing and asthma.    Gastrointestinal: Negative for nausea and vomiting.   Musculoskeletal: Negative for muscle ache.   Skin: Negative for rash.   Allergic/Immunologic: Negative for seasonal allergies and asthma.   Hematologic/Lymphatic: Negative for swollen lymph nodes.       Objective:      Physical Exam   Constitutional: He is oriented to person, place, and time. He " appears well-developed and well-nourished. He is cooperative.  Non-toxic appearance. He does not have a sickly appearance. He does not appear ill. No distress.   HENT:   Head: Normocephalic and atraumatic.   Right Ear: Hearing and external ear normal. A foreign body (cerumen impaction) is present.   Left Ear: Hearing and external ear normal. A foreign body (cerumen impaction) is present.   Nose: Mucosal edema and rhinorrhea present. No nasal deformity. No epistaxis. Right sinus exhibits maxillary sinus tenderness. Right sinus exhibits no frontal sinus tenderness. Left sinus exhibits maxillary sinus tenderness. Left sinus exhibits no frontal sinus tenderness.   Mouth/Throat: Uvula is midline and mucous membranes are normal. No trismus in the jaw. Normal dentition. No uvula swelling. No posterior oropharyngeal edema or posterior oropharyngeal erythema.   Eyes: Conjunctivae and lids are normal. No scleral icterus.   Neck: Trachea normal, full passive range of motion without pain and phonation normal. Neck supple. No neck rigidity. No edema and no erythema present.   Cardiovascular: Normal rate, regular rhythm, normal heart sounds, intact distal pulses and normal pulses.   Pulmonary/Chest: Effort normal. No respiratory distress. He has no decreased breath sounds. He has wheezes (expiratory, all lung fields). He has no rhonchi.   Congestion heard in all lung fields on expiration.   Abdominal: Normal appearance.   Musculoskeletal: Normal range of motion. He exhibits no edema, tenderness or deformity.   Lymphadenopathy:     He has cervical adenopathy.        Right cervical: Superficial cervical adenopathy present.        Left cervical: Superficial cervical adenopathy present.   Neurological: He is alert and oriented to person, place, and time. He exhibits normal muscle tone. Coordination normal.   Skin: Skin is warm, dry, intact, not diaphoretic, not pale and no rash.   Psychiatric: He has a normal mood and affect. His  speech is normal and behavior is normal. Judgment and thought content normal. Cognition and memory are normal.   Nursing note and vitals reviewed.      Xr Chest Pa And Lateral    Result Date: 5/4/2020  EXAMINATION: XR CHEST PA AND LATERAL CLINICAL HISTORY: Cough TECHNIQUE: PA and lateral views of the chest were performed. COMPARISON: Chest radiograph from 01/12/2017 FINDINGS: The lungs are clear, with normal appearance of pulmonary vasculature and no pleural effusion or pneumothorax. The cardiac silhouette is normal in size. The hilar and mediastinal contours are unremarkable. Bones are intact.     No acute abnormality. Electronically signed by: Bandar Martin MD Date:    05/04/2020 Time:    10:18    Assessment:       1. Sinusitis, bacterial    2. Cough    3. Bronchitis        Plan:         Sinusitis, bacterial  -     amoxicillin-clavulanate 875-125mg (AUGMENTIN) 875-125 mg per tablet; Take 1 tablet by mouth 2 (two) times daily. for 10 days  Dispense: 20 tablet; Refill: 0  -     fluticasone propionate (FLONASE) 50 mcg/actuation nasal spray; 1 spray (50 mcg total) by Each Nostril route once daily. for 7 days  Dispense: 15 g; Refill: 0    Cough  -     XR CHEST PA AND LATERAL; Future; Expected date: 05/04/2020    Bronchitis  -     albuterol (PROVENTIL/VENTOLIN HFA) 90 mcg/actuation inhaler; Inhale 1-2 puffs into the lungs every 4 (four) hours as needed.  Dispense: 18 g; Refill: 0  -     benzonatate (TESSALON) 100 MG capsule; Take 1 capsule (100 mg total) by mouth 3 (three) times daily as needed for Cough.  Dispense: 20 capsule; Refill: 0         Galileo was seen today for sinus problem.    Diagnoses and all orders for this visit:    Sinusitis, bacterial  -     amoxicillin-clavulanate 875-125mg (AUGMENTIN) 875-125 mg per tablet; Take 1 tablet by mouth 2 (two) times daily. for 10 days  -     fluticasone propionate (FLONASE) 50 mcg/actuation nasal spray; 1 spray (50 mcg total) by Each Nostril route once daily. for 7  days    Cough  -     XR CHEST PA AND LATERAL; Future    Bronchitis  -     albuterol (PROVENTIL/VENTOLIN HFA) 90 mcg/actuation inhaler; Inhale 1-2 puffs into the lungs every 4 (four) hours as needed.  -     benzonatate (TESSALON) 100 MG capsule; Take 1 capsule (100 mg total) by mouth 3 (three) times daily as needed for Cough.      Patient Instructions   - Rest.  - Drink plenty of fluids.  - Take Tylenol and/or Ibuprofen as directed as needed for fever/pain.  Do not take more than the recommended dose.  - follow up with your PCP within the next 1-2 weeks as needed.  - Take over-the-counter claritin, zyrtec, allegra, or xyzal as directed.  You should NOT use a decongestant form (D) of this medication if you have a history of hypertension or heart disease.  - Use over the counter Flonase as directed for sinus congestion and postnasal drip.  - use nasal saline prior to Flonase.  - stop using Flonase if you developed nosebleeds.  - Use Ocean Spray Nasal Saline 1-3 puffs each nostril every 2-3 hours then blow out onto tissue. This is to irrigate the nasal passage way to clear the sinus openings. Use until sinus problem resolved.  - You must understand that you have received an Urgent Care treatment only and that you may be released before all of your medical problems are known or treated.   - You, the patient, will arrange for follow up care as instructed.   - If your condition worsens or fails to improve we recommend that you receive another evaluation at the ER immediately or contact your PCP to discuss your concerns.   - You can call [phone] or [phone] to help schedule an appointment with the appropriate provider.      Sinusitis (Antibiotic Treatment)    The sinuses are air-filled spaces within the bones of the face. They connect to the inside of the nose. Sinusitis is an inflammation of the tissue lining the sinus cavity. Sinus inflammation can occur during a cold. It can also be due to allergies to  pollens and other particles in the air. Sinusitis can cause symptoms of sinus congestion and fullness. A sinus infection causes fever, headache and facial pain. There is often green or yellow drainage from the nose or into the back of the throat (post-nasal drip). You have been given antibiotics to treat this condition.  Home care:  · Take the full course of antibiotics as instructed. Do not stop taking them, even if you feel better.  · Drink plenty of water, hot tea, and other liquids. This may help thin mucus. It also may promote sinus drainage.  · Heat may help soothe painful areas of the face. Use a towel soaked in hot water. Or,  the shower and direct the hot spray onto your face. Using a vaporizer along with a menthol rub at night may also help.   · An expectorant containing guaifenesin may help thin the mucus and promote drainage from the sinuses.  · Over-the-counter decongestants may be used unless a similar medicine was prescribed. Nasal sprays work the fastest. Use one that contains phenylephrine or oxymetazoline. First blow the nose gently. Then use the spray. Do not use these medicines more often than directed on the label or symptoms may get worse. You may also use tablets containing pseudoephedrine. Avoid products that combine ingredients, because side effects may be increased. Read labels. You can also ask the pharmacist for help. (NOTE: Persons with high blood pressure should not use decongestants. They can raise blood pressure.)  · Over-the-counter antihistamines may help if allergies contributed to your sinusitis.    · Do not use nasal rinses or irrigation during an acute sinus infection, unless told to by your health care provider. Rinsing may spread the infection to other sinuses.  · Use acetaminophen or ibuprofen to control pain, unless another pain medicine was prescribed. (If you have chronic liver or kidney disease or ever had a stomach ulcer, talk with your doctor before using these  medicines. Aspirin should never be used in anyone under 18 years of age who is ill with a fever. It may cause severe liver damage.)  · Don't smoke. This can worsen symptoms.  Follow-up care  Follow up with your healthcare provider or our staff if you are not improving within the next week.  When to seek medical advice  Call your healthcare provider if any of these occur:  · Facial pain or headache becoming more severe  · Stiff neck  · Unusual drowsiness or confusion  · Swelling of the forehead or eyelids  · Vision problems, including blurred or double vision  · Fever of 100.4ºF (38ºC) or higher, or as directed by your healthcare provider  · Seizure  · Breathing problems  · Symptoms not resolving within 10 days  Date Last Reviewed: 4/13/2015 © 2000-2017 [employer]. [address] [place] [address], [place] [address]. All rights reserved. This information is not intended as a substitute for professional medical care. Always follow your healthcare professional's instructions.        Bronchitis with Wheezing (Viral or Bacterial: Adult)    Bronchitis is an infection of the air passages. It often occurs during a cold and is usually caused by a virus. Symptoms include cough with mucus (phlegm) and low-grade fever. This illness is contagious during the first few days and is spread through the air by coughing and sneezing, or by direct contact (touching the sick person and then touching your own eyes, nose, or mouth).  If there is a lot of inflammation, air flow is restricted. The air passages may also go into spasm, especially if you have asthma. This causes wheezing and difficulty breathing even in people who do not have asthma.  Bronchitis usually lasts 7 to 14 days. The wheezing should improve with treatment during the first week. An inhaler is often prescribed to relax the air passages and stop wheezing. Antibiotics will be prescribed if your doctor thinks there is also a secondary bacterial infection.  Home  care  · If symptoms are severe, rest at home for the first 2 to 3 days. When you go back to your usual activities, don't let yourself get too tired.  · Do not smoke. Also avoid being exposed to secondhand smoke.  · You may use over-the-counter medicine to control fever or pain, unless another medicine was prescribed. Note: If you have chronic liver or kidney disease or have ever had a stomach ulcer or gastrointestinal bleeding, talk with your healthcare provider before using these medicines. Also talk to your provider if you are taking medicine to prevent blood clots.) Aspirin should never be given to anyone younger than 18 years of age who is ill with a viral infection or fever. It may cause severe liver or brain damage.  · Your appetite may be poor, so a light diet is fine. Avoid dehydration by drinking 6 to 8 glasses of fluids per day (such as water, soft drinks, sports drinks, juices, tea, or soup). Extra fluids will help loosen secretions in the nose and lungs.  · Over-the-counter cough, cold, and sore-throat medicines will not shorten the length of the illness, but they may be helpful to reduce symptoms. (Note: Do not use decongestants if you have high blood pressure.)  · If you were given an inhaler, use it exactly as directed. If you need to use it more often than prescribed, your condition may be worsening. If this happens, contact your healthcare provider.  · If prescribed, finish all antibiotic medicine, even if you are feeling better after only a few days.  Follow-up care  Follow up with your healthcare provider, or as advised. If you had an X-ray or ECG (electrocardiogram), a specialist will review it. You will be notified of any new findings that may affect your care.  Note: If you are age 65 or older, or if you have a chronic lung disease or condition that affects your immune system, or you smoke, talk to your healthcare provider about having a pneumococcal vaccinations and a yearly influenza  vaccination (flu shot).  When to seek medical advice  Call your healthcare provider right away if any of these occur:  · Fever of 100.4°F (38°C) or higher  · Coughing up increasing amounts of colored sputum  · Weakness, drowsiness, headache, facial pain, ear pain, or a stiff neck  Call 911, or get immediate medical care  Contact emergency services right away if any of these occur.  · Coughing up blood  · Worsening weakness, drowsiness, headache, or stiff neck  · Increased wheezing not helped with medication, shortness of breath, or pain with breathing  Date Last Reviewed: 9/13/2015  © 8965-1684 [employer]. [address] [place] [address], [place] [address]. All rights reserved. This information is not intended as a substitute for professional medical care. Always follow your healthcare professional's instructions.

## 2020-12-14 ENCOUNTER — CLINICAL SUPPORT (OUTPATIENT)
Dept: URGENT CARE | Facility: CLINIC | Age: 25
End: 2020-12-14
Payer: MEDICAID

## 2020-12-14 DIAGNOSIS — Z11.9 SCREENING EXAMINATION FOR UNSPECIFIED INFECTIOUS DISEASE: Primary | ICD-10-CM

## 2020-12-14 LAB
CTP QC/QA: YES
SARS-COV-2 RDRP RESP QL NAA+PROBE: NEGATIVE

## 2020-12-14 PROCEDURE — 87635: ICD-10-PCS | Mod: QW,S$GLB,, | Performed by: PHYSICIAN ASSISTANT

## 2020-12-14 PROCEDURE — 87635 SARS-COV-2 COVID-19 AMP PRB: CPT | Mod: QW,S$GLB,, | Performed by: PHYSICIAN ASSISTANT

## 2020-12-14 NOTE — LETTER
"     [address], [address] [address]  Phone: [phone]  Fax: [phone]          Return to Work/School    Patient: Galileo Johnson  YOB: 1995   Date: 12/14/2020     To Whom It May Concern:     Galileo Johnson was in contact with/seen in my office on 12/14/2020. COVID-19 is present in our communities across the state. There is limited testing for COVID at this time, so not all patients can be tested. In this situation, your employee meets the following criteria:     Galileo Johnson has met the criteria for COVID-19 testing and has a NEGATIVE result. The employee can return to work once they are asymptomatic for 24 hours without the use of fever reducing medications (Tylenol, Motrin, etc).  NEGATIVE COVID TEST  -You have tested negative for COVID-19 today.  If you did not have a close exposure (as defined below) you can return to your normal daily activities to include social distancing, wearing a mask and frequent handwashing.  -A "close exposure" is defined as anyone who has had an exposure (masked or unmasked) to a known COVID -19 positive person within 6 ft for longer than 15 minutes. If your exposure meets this definition, you are required by CDC guidelines to quarantine for at least 7-10 days from time of exposure.  -The CDC states that a test can be performed for an asymptomatic patient (someone who does not have any symptoms) after a close exposure, and that a test should be done if you develop symptoms after a close exposure as described above.  -Specifically, you can test at day 5 or later if asymptomatic in order to get released from quarantine on day 7 or later.  If you develop symptoms sooner, you should test when your symptoms start.  -If you developed symptoms since the exposure, and your test was negative today and less than 5 days from your exposure, you still have to quarantine for 7-10 days from the date of the exposure.  -The 7-10 day quarantine begins " from the day you were exposed, not the day of your test.  For example, if your exposure was on a Monday, and you waited until Friday of the same week to get tested and it was negative, your 7-10 day quarantine begins from that Monday, not the Friday you tested negative.  -Please note, if you decide to test as an asymptomatic during your quarantine and you are positive, you will be restarting your quarantine and moving from a possible 10 day quarantine (if you do not test), to a 11 day or greater quarantine.     If you have any questions or concerns, or if I can be of further assistance, please do not hesitate to contact me.     Sincerely,    [facility], [facility]

## 2020-12-14 NOTE — PROGRESS NOTES
CDC Testing and Quarantine Guidelines for patients with exposure to a known-positive COVID-19 person:  -A close exposure is defined as anyone who has had an exposure (masked or unmasked) to a known COVID -19 positive person within 6 ft for longer than 15 minutes. If your exposure meets this definition you are required by CDC guidelines to quarantine for at least 7-10 days from time of exposure. The CDC states that a test can be performed for an asymptomatic patient (someone who does not have any symptoms) after a close exposure, and that a test should be done if you develop symptoms after a close exposure as described above.  Specifically, you can test at day 5 or later if asymptomatic, in order to get released from quarantine on day 7 or later.  -If you develop symptoms sooner, you should test when your symptoms start.  -If you meet the definition of a close exposure, it will not matter whether you are experiencing symptoms- a quarantine for at least 7-10 days after a close exposure is required by CDC guidelines.  -Please note, if you decide to test as an asymptomatic during your quarantine and you are positive, you will be restarting your quarantine and moving from a possible 10 day quarantine (if you do not test), to a 11 day or greater quarantine.  -The CDC also suggests people still monitor for symptoms for a full 14 days and remember that the shorter quarantine options do not replace initial CDC guidance.  The CDC continues to recommend quarantining for 14 days as the best way to reduce risk for spreading COVID-19 - however, this is only a recommendation.  -If your exposure does not meet the above definition, you can return to your normal daily activities to include social distancing, wearing a mask and frequent handwashing.       Faxed HH referral to [facility] per choice form.     Addendum: Also faxed DME referral to [facility] for Oxygen and walker per choice form.

## 2021-01-20 ENCOUNTER — CLINICAL SUPPORT (OUTPATIENT)
Dept: URGENT CARE | Facility: CLINIC | Age: 26
End: 2021-01-20
Payer: MEDICAID

## 2021-01-20 VITALS — TEMPERATURE: 99 F | OXYGEN SATURATION: 97 % | HEART RATE: 63 BPM

## 2021-01-20 DIAGNOSIS — U07.1 COVID-19: Primary | ICD-10-CM

## 2021-01-20 LAB
CTP QC/QA: YES
SARS-COV-2 RDRP RESP QL NAA+PROBE: NEGATIVE

## 2021-01-20 PROCEDURE — 87635: ICD-10-PCS | Mod: QW,S$GLB,, | Performed by: NURSE PRACTITIONER

## 2021-01-20 PROCEDURE — 87635 SARS-COV-2 COVID-19 AMP PRB: CPT | Mod: QW,S$GLB,, | Performed by: NURSE PRACTITIONER

## 2021-01-29 ENCOUNTER — CLINICAL SUPPORT (OUTPATIENT)
Dept: URGENT CARE | Facility: CLINIC | Age: 26
End: 2021-01-29
Payer: MEDICAID

## 2021-01-29 DIAGNOSIS — Z11.9 ENCOUNTER FOR SCREENING EXAMINATION FOR INFECTIOUS DISEASE: Primary | ICD-10-CM

## 2021-01-29 DIAGNOSIS — U07.1 COVID-19 VIRUS DETECTED: ICD-10-CM

## 2021-01-29 LAB
CTP QC/QA: YES
SARS-COV-2 RDRP RESP QL NAA+PROBE: POSITIVE

## 2021-01-29 PROCEDURE — 87635 SARS-COV-2 COVID-19 AMP PRB: CPT | Mod: QW,S$GLB,, | Performed by: PHYSICIAN ASSISTANT

## 2021-01-29 PROCEDURE — 87635: ICD-10-PCS | Mod: QW,S$GLB,, | Performed by: PHYSICIAN ASSISTANT

## 2021-02-03 ENCOUNTER — NURSE TRIAGE (OUTPATIENT)
Dept: ADMINISTRATIVE | Facility: CLINIC | Age: 26
End: 2021-02-03

## 2021-06-03 ENCOUNTER — OFFICE VISIT (OUTPATIENT)
Dept: URGENT CARE | Facility: CLINIC | Age: 26
End: 2021-06-03
Payer: MEDICAID

## 2021-06-03 VITALS
OXYGEN SATURATION: 99 % | SYSTOLIC BLOOD PRESSURE: 114 MMHG | DIASTOLIC BLOOD PRESSURE: 67 MMHG | WEIGHT: 130 LBS | RESPIRATION RATE: 18 BRPM | HEIGHT: 72 IN | HEART RATE: 74 BPM | BODY MASS INDEX: 17.61 KG/M2 | TEMPERATURE: 98 F

## 2021-06-03 DIAGNOSIS — Z87.442 HISTORY OF NEPHROLITHIASIS: ICD-10-CM

## 2021-06-03 DIAGNOSIS — R10.9 ACUTE RIGHT FLANK PAIN: ICD-10-CM

## 2021-06-03 DIAGNOSIS — K59.00 CONSTIPATION, UNSPECIFIED CONSTIPATION TYPE: Primary | ICD-10-CM

## 2021-06-03 DIAGNOSIS — K21.9 GASTROESOPHAGEAL REFLUX DISEASE WITHOUT ESOPHAGITIS: ICD-10-CM

## 2021-06-03 DIAGNOSIS — R11.10 CHRONIC VOMITING: ICD-10-CM

## 2021-06-03 DIAGNOSIS — Z87.19 HISTORY OF IBS: ICD-10-CM

## 2021-06-03 LAB
BILIRUB UR QL STRIP: NEGATIVE
GLUCOSE UR QL STRIP: NEGATIVE
KETONES UR QL STRIP: NEGATIVE
LEUKOCYTE ESTERASE UR QL STRIP: NEGATIVE
PH, POC UA: 5.5 (ref 5–8)
POC BLOOD, URINE: NEGATIVE
POC NITRATES, URINE: NEGATIVE
PROT UR QL STRIP: NEGATIVE
SP GR UR STRIP: 1.02 (ref 1–1.03)
UROBILINOGEN UR STRIP-ACNC: NORMAL (ref 0.3–2.2)

## 2021-06-03 PROCEDURE — 81003 POCT URINALYSIS, DIPSTICK, AUTOMATED, W/O SCOPE: ICD-10-PCS | Mod: QW,S$GLB,, | Performed by: PHYSICIAN ASSISTANT

## 2021-06-03 PROCEDURE — 99214 PR OFFICE/OUTPT VISIT, EST, LEVL IV, 30-39 MIN: ICD-10-PCS | Mod: 25,S$GLB,, | Performed by: PHYSICIAN ASSISTANT

## 2021-06-03 PROCEDURE — 74018 RADEX ABDOMEN 1 VIEW: CPT | Mod: S$GLB,,, | Performed by: RADIOLOGY

## 2021-06-03 PROCEDURE — 74018 XR KUB: ICD-10-PCS | Mod: S$GLB,,, | Performed by: RADIOLOGY

## 2021-06-03 PROCEDURE — 81003 URINALYSIS AUTO W/O SCOPE: CPT | Mod: QW,S$GLB,, | Performed by: PHYSICIAN ASSISTANT

## 2021-06-03 PROCEDURE — 99214 OFFICE O/P EST MOD 30 MIN: CPT | Mod: 25,S$GLB,, | Performed by: PHYSICIAN ASSISTANT

## 2021-06-03 RX ORDER — DICYCLOMINE HYDROCHLORIDE 10 MG/1
10 CAPSULE ORAL
Qty: 20 CAPSULE | Refills: 0 | Status: SHIPPED | OUTPATIENT
Start: 2021-06-03 | End: 2021-06-08

## 2021-06-03 RX ORDER — POLYETHYLENE GLYCOL 3350 17 G/17G
17 POWDER, FOR SOLUTION ORAL DAILY
Qty: 116 G | Refills: 0 | Status: SHIPPED | OUTPATIENT
Start: 2021-06-03

## 2021-06-07 LAB
BACTERIA UR CULT: NO GROWTH
BACTERIA UR CULT: NORMAL

## 2022-05-05 ENCOUNTER — OFFICE VISIT (OUTPATIENT)
Dept: URGENT CARE | Facility: CLINIC | Age: 27
End: 2022-05-05
Payer: MEDICAID

## 2022-05-05 VITALS
TEMPERATURE: 98 F | DIASTOLIC BLOOD PRESSURE: 69 MMHG | WEIGHT: 130 LBS | BODY MASS INDEX: 17.61 KG/M2 | OXYGEN SATURATION: 98 % | HEIGHT: 72 IN | RESPIRATION RATE: 18 BRPM | SYSTOLIC BLOOD PRESSURE: 114 MMHG | HEART RATE: 65 BPM

## 2022-05-05 DIAGNOSIS — R11.10 VOMITING, INTRACTABILITY OF VOMITING NOT SPECIFIED, PRESENCE OF NAUSEA NOT SPECIFIED, UNSPECIFIED VOMITING TYPE: Primary | ICD-10-CM

## 2022-05-05 LAB
CTP QC/QA: YES
POC MOLECULAR INFLUENZA A AGN: NEGATIVE
POC MOLECULAR INFLUENZA B AGN: NEGATIVE

## 2022-05-05 PROCEDURE — 87502 POCT INFLUENZA A/B MOLECULAR: ICD-10-PCS | Mod: QW,S$GLB,, | Performed by: PHYSICIAN ASSISTANT

## 2022-05-05 PROCEDURE — 87502 INFLUENZA DNA AMP PROBE: CPT | Mod: QW,S$GLB,, | Performed by: PHYSICIAN ASSISTANT

## 2022-05-05 PROCEDURE — 3078F DIAST BP <80 MM HG: CPT | Mod: CPTII,S$GLB,, | Performed by: PHYSICIAN ASSISTANT

## 2022-05-05 PROCEDURE — 3078F PR MOST RECENT DIASTOLIC BLOOD PRESSURE < 80 MM HG: ICD-10-PCS | Mod: CPTII,S$GLB,, | Performed by: PHYSICIAN ASSISTANT

## 2022-05-05 PROCEDURE — 99203 PR OFFICE/OUTPT VISIT, NEW, LEVL III, 30-44 MIN: ICD-10-PCS | Mod: S$GLB,,, | Performed by: PHYSICIAN ASSISTANT

## 2022-05-05 PROCEDURE — 1160F PR REVIEW ALL MEDS BY PRESCRIBER/CLIN PHARMACIST DOCUMENTED: ICD-10-PCS | Mod: CPTII,S$GLB,, | Performed by: PHYSICIAN ASSISTANT

## 2022-05-05 PROCEDURE — 3074F PR MOST RECENT SYSTOLIC BLOOD PRESSURE < 130 MM HG: ICD-10-PCS | Mod: CPTII,S$GLB,, | Performed by: PHYSICIAN ASSISTANT

## 2022-05-05 PROCEDURE — 1159F PR MEDICATION LIST DOCUMENTED IN MEDICAL RECORD: ICD-10-PCS | Mod: CPTII,S$GLB,, | Performed by: PHYSICIAN ASSISTANT

## 2022-05-05 PROCEDURE — 1159F MED LIST DOCD IN RCRD: CPT | Mod: CPTII,S$GLB,, | Performed by: PHYSICIAN ASSISTANT

## 2022-05-05 PROCEDURE — 3008F PR BODY MASS INDEX (BMI) DOCUMENTED: ICD-10-PCS | Mod: CPTII,S$GLB,, | Performed by: PHYSICIAN ASSISTANT

## 2022-05-05 PROCEDURE — 99203 OFFICE O/P NEW LOW 30 MIN: CPT | Mod: S$GLB,,, | Performed by: PHYSICIAN ASSISTANT

## 2022-05-05 PROCEDURE — 3074F SYST BP LT 130 MM HG: CPT | Mod: CPTII,S$GLB,, | Performed by: PHYSICIAN ASSISTANT

## 2022-05-05 PROCEDURE — 3008F BODY MASS INDEX DOCD: CPT | Mod: CPTII,S$GLB,, | Performed by: PHYSICIAN ASSISTANT

## 2022-05-05 PROCEDURE — 1160F RVW MEDS BY RX/DR IN RCRD: CPT | Mod: CPTII,S$GLB,, | Performed by: PHYSICIAN ASSISTANT

## 2022-05-05 RX ORDER — ONDANSETRON 4 MG/1
4 TABLET, ORALLY DISINTEGRATING ORAL EVERY 8 HOURS PRN
Qty: 15 TABLET | Refills: 1 | OUTPATIENT
Start: 2022-05-05 | End: 2022-07-07

## 2022-05-05 RX ORDER — DICYCLOMINE HYDROCHLORIDE 20 MG/1
20 TABLET ORAL 2 TIMES DAILY
COMMUNITY
Start: 2022-03-24 | End: 2022-07-07

## 2022-05-05 RX ORDER — PROMETHAZINE HYDROCHLORIDE 12.5 MG/1
12.5 TABLET ORAL EVERY 8 HOURS PRN
Qty: 12 TABLET | Refills: 0 | Status: SHIPPED | OUTPATIENT
Start: 2022-05-05 | End: 2022-06-16 | Stop reason: SDUPTHER

## 2022-05-05 NOTE — LETTER
May 5, 2022      [facility] - Urgent Care  [address], [address]  [place] [address]  Phone: [phone]  Fax: [phone]       Patient: Galileo Johnson   YOB: 1995  Date of Visit: 05/05/2022    To Whom It May Concern:    Jarad Johnson  was at [facility] on 05/05/2022. The patient may return to work/school on 5/6/2022 w/ no  restrictions. If you have any questions or concerns, or if I can be of further assistance, please do not hesitate to contact me.    Sincerely,    Donte Chaney PA

## 2022-05-05 NOTE — PROGRESS NOTES
Subjective:       Patient ID: Galileo Johnson is a 27 y.o. male.    Vitals:  height is 6' (1.829 m) and weight is 59 kg (130 lb). His temperature is 97.8 °F (36.6 °C). His blood pressure is 114/69 and his pulse is 65. His respiration is 18 and oxygen saturation is 98%.     Chief Complaint: Vomiting    Pt states that he started with vomiting 2 days ago and has not taking any  otc meds .  PT  Getting better . No abd pain and no diarrhea     Emesis   This is a new problem. The current episode started in the past 7 days. The problem occurs 2 to 4 times per day. The problem has been unchanged. The emesis has an appearance of stomach contents. There has been no fever. He has tried nothing for the symptoms.       Gastrointestinal: Positive for nausea and vomiting.       Objective:      Physical Exam   Constitutional: He is oriented to person, place, and time. He appears well-developed.   HENT:   Head: Normocephalic and atraumatic.   Ears:   Right Ear: External ear normal.   Left Ear: External ear normal.   Nose: Nose normal. No rhinorrhea or congestion.   Mouth/Throat: Mucous membranes are normal. No oropharyngeal exudate or posterior oropharyngeal erythema.   Eyes: Conjunctivae and lids are normal.      extraocular movement intact   Neck: Trachea normal. Neck supple. No neck rigidity present.   Cardiovascular: Normal rate, regular rhythm and normal heart sounds.   Pulmonary/Chest: Effort normal and breath sounds normal. No respiratory distress. He has no wheezes. He has no rhonchi.   Abdominal: Normal appearance and bowel sounds are normal. He exhibits no distension, no abdominal bruit, no pulsatile midline mass and no mass. Soft. There is no abdominal tenderness.   Musculoskeletal: Normal range of motion.         General: Normal range of motion.   Neurological: He is alert and oriented to person, place, and time. He has normal strength.   Skin: Skin is warm, dry, intact, not diaphoretic and not pale. Capillary refill  takes less than 2 seconds.   Psychiatric: His speech is normal and behavior is normal. Judgment and thought content normal.   Nursing note and vitals reviewed.    Results for orders placed or performed in visit on 05/05/22   POCT Influenza A/B MOLECULAR   Result Value Ref Range    POC Molecular Influenza A Ag Negative Negative, Not Reported    POC Molecular Influenza B Ag Negative Negative, Not Reported     Acceptable Yes     No results found.       Assessment:       1. Vomiting, intractability of vomiting not specified, presence of nausea not specified, unspecified vomiting type          Plan:         Vomiting, intractability of vomiting not specified, presence of nausea not specified, unspecified vomiting type  -     POCT Influenza A/B MOLECULAR  -     ondansetron (ZOFRAN-ODT) 4 MG TbDL; Take 1 tablet (4 mg total) by mouth every 8 (eight) hours as needed (nausea).  Dispense: 15 tablet; Refill: 1  -     promethazine (PHENERGAN) 12.5 MG Tab; Take 1 tablet (12.5 mg total) by mouth every 8 (eight) hours as needed (nausea).  Dispense: 12 tablet; Refill: 0    Follow up if symptoms worsen or fail to improve, for F/U with PCP or ED. There are no Patient Instructions on file for this visit.

## 2022-06-16 ENCOUNTER — OFFICE VISIT (OUTPATIENT)
Dept: URGENT CARE | Facility: CLINIC | Age: 27
End: 2022-06-16
Payer: MEDICAID

## 2022-06-16 VITALS
HEART RATE: 99 BPM | TEMPERATURE: 99 F | RESPIRATION RATE: 18 BRPM | WEIGHT: 130 LBS | DIASTOLIC BLOOD PRESSURE: 72 MMHG | OXYGEN SATURATION: 96 % | HEIGHT: 72 IN | SYSTOLIC BLOOD PRESSURE: 105 MMHG | BODY MASS INDEX: 17.61 KG/M2

## 2022-06-16 DIAGNOSIS — R11.10 VOMITING, INTRACTABILITY OF VOMITING NOT SPECIFIED, PRESENCE OF NAUSEA NOT SPECIFIED, UNSPECIFIED VOMITING TYPE: Primary | ICD-10-CM

## 2022-06-16 LAB
CTP QC/QA: YES
SARS-COV-2 RDRP RESP QL NAA+PROBE: NEGATIVE

## 2022-06-16 PROCEDURE — 99213 PR OFFICE/OUTPT VISIT, EST, LEVL III, 20-29 MIN: ICD-10-PCS | Mod: S$GLB,,, | Performed by: NURSE PRACTITIONER

## 2022-06-16 PROCEDURE — 99213 OFFICE O/P EST LOW 20 MIN: CPT | Mod: S$GLB,,, | Performed by: NURSE PRACTITIONER

## 2022-06-16 PROCEDURE — 3074F PR MOST RECENT SYSTOLIC BLOOD PRESSURE < 130 MM HG: ICD-10-PCS | Mod: CPTII,S$GLB,, | Performed by: NURSE PRACTITIONER

## 2022-06-16 PROCEDURE — 1159F PR MEDICATION LIST DOCUMENTED IN MEDICAL RECORD: ICD-10-PCS | Mod: CPTII,S$GLB,, | Performed by: NURSE PRACTITIONER

## 2022-06-16 PROCEDURE — 3078F PR MOST RECENT DIASTOLIC BLOOD PRESSURE < 80 MM HG: ICD-10-PCS | Mod: CPTII,S$GLB,, | Performed by: NURSE PRACTITIONER

## 2022-06-16 PROCEDURE — 3078F DIAST BP <80 MM HG: CPT | Mod: CPTII,S$GLB,, | Performed by: NURSE PRACTITIONER

## 2022-06-16 PROCEDURE — 1160F RVW MEDS BY RX/DR IN RCRD: CPT | Mod: CPTII,S$GLB,, | Performed by: NURSE PRACTITIONER

## 2022-06-16 PROCEDURE — 1159F MED LIST DOCD IN RCRD: CPT | Mod: CPTII,S$GLB,, | Performed by: NURSE PRACTITIONER

## 2022-06-16 PROCEDURE — 3008F BODY MASS INDEX DOCD: CPT | Mod: CPTII,S$GLB,, | Performed by: NURSE PRACTITIONER

## 2022-06-16 PROCEDURE — 87635: ICD-10-PCS | Mod: QW,S$GLB,, | Performed by: NURSE PRACTITIONER

## 2022-06-16 PROCEDURE — 3008F PR BODY MASS INDEX (BMI) DOCUMENTED: ICD-10-PCS | Mod: CPTII,S$GLB,, | Performed by: NURSE PRACTITIONER

## 2022-06-16 PROCEDURE — 1160F PR REVIEW ALL MEDS BY PRESCRIBER/CLIN PHARMACIST DOCUMENTED: ICD-10-PCS | Mod: CPTII,S$GLB,, | Performed by: NURSE PRACTITIONER

## 2022-06-16 PROCEDURE — 87635 SARS-COV-2 COVID-19 AMP PRB: CPT | Mod: QW,S$GLB,, | Performed by: NURSE PRACTITIONER

## 2022-06-16 PROCEDURE — 3074F SYST BP LT 130 MM HG: CPT | Mod: CPTII,S$GLB,, | Performed by: NURSE PRACTITIONER

## 2022-06-16 RX ORDER — LAMOTRIGINE 25 MG/1
50 TABLET ORAL DAILY
COMMUNITY
Start: 2022-05-16

## 2022-06-16 RX ORDER — ONDANSETRON 2 MG/ML
4 INJECTION INTRAMUSCULAR; INTRAVENOUS
Status: COMPLETED | OUTPATIENT
Start: 2022-06-16 | End: 2022-06-16

## 2022-06-16 RX ORDER — TRAZODONE HYDROCHLORIDE 50 MG/1
50-150 TABLET ORAL NIGHTLY PRN
COMMUNITY
Start: 2022-06-07

## 2022-06-16 RX ORDER — IBUPROFEN 800 MG/1
800 TABLET ORAL EVERY 8 HOURS PRN
COMMUNITY
Start: 2022-03-03

## 2022-06-16 RX ORDER — PROMETHAZINE HYDROCHLORIDE 12.5 MG/1
12.5 TABLET ORAL EVERY 8 HOURS PRN
Qty: 12 TABLET | Refills: 0 | Status: SHIPPED | OUTPATIENT
Start: 2022-06-16

## 2022-06-16 RX ORDER — BUPROPION HYDROCHLORIDE 150 MG/1
150 TABLET ORAL EVERY MORNING
COMMUNITY
Start: 2022-05-16

## 2022-06-16 RX ADMIN — ONDANSETRON 4 MG: 2 INJECTION INTRAMUSCULAR; INTRAVENOUS at 01:06

## 2022-06-16 NOTE — LETTER
June 16, 2022      [facility] - Urgent Care  [address], [address]  [place] [address]  Phone: [phone]  Fax: [phone]       Patient: Galileo Johnson   YOB: 1995  Date of Visit: 06/16/2022    To Whom It May Concern:    Jarad Johnson  was at [facility] on 06/16/2022. The patient may return to work/school on 6/17/2022. If you have any questions or concerns, or if I can be of further assistance, please do not hesitate to contact me.    Sincerely,    Sergo Tenorio NP

## 2022-06-16 NOTE — PROGRESS NOTES
Subjective:       Patient ID: Galielo Johnson is a 27 y.o. male.    Vitals:  height is 6' (1.829 m) and weight is 59 kg (130 lb). His oral temperature is 98.8 °F (37.1 °C). His blood pressure is 105/72 and his pulse is 99. His respiration is 18 and oxygen saturation is 96%.     Chief Complaint: Emesis    27-year-old male presents to clinic for evaluation of nausea/vomiting that started yesterday morning.  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  States symptoms started after work yesterday.  He states that he was seen in this clinic 1 month ago for similar symptoms.  At that time he tested negative for the flu.  He is not vaccinated for COVID, denies any recent sick contacts.  He states that he took a previously prescribed Zofran tablet yesterday with minimal relief.  He does report some nasal congestion.  He denies chest pain, fever, shortness of breath.  He is awake and alert, answers questions appropriately, no acute distress noted on today's visit.    Emesis   This is a new problem. The current episode started yesterday. The problem occurs more than 10 times per day. The emesis has an appearance of bile. There has been no fever. Associated symptoms include diarrhea, headaches and sweats. Pertinent negatives include no chest pain, chills, dizziness or fever. He has tried nothing for the symptoms. The treatment provided no relief.       Constitution: Negative for activity change, appetite change, chills, sweating, fatigue and fever.   HENT: Positive for congestion. Negative for sore throat.    Cardiovascular: Negative for chest pain.   Gastrointestinal: Positive for vomiting and diarrhea.   Neurological: Positive for headaches. Negative for dizziness, light-headedness, passing out and loss of consciousness.       Objective:      Physical Exam   Constitutional: He is oriented to person, place, and time. He appears well-developed.  Non-toxic appearance. He does not appear ill. No distress.   HENT:    Head: Normocephalic and atraumatic. Head is without abrasion, without contusion and without laceration.   Ears:   Right Ear: External ear normal.   Left Ear: External ear normal.   Nose: Congestion present.   Mouth/Throat: Oropharynx is clear and moist and mucous membranes are normal. Mucous membranes are moist. No posterior oropharyngeal erythema. Oropharynx is clear.   Eyes: Conjunctivae, EOM and lids are normal. Right eye exhibits no discharge. Left eye exhibits no discharge.   Neck: Trachea normal and phonation normal. Neck supple.   Cardiovascular: Normal rate, regular rhythm and normal heart sounds.   Pulmonary/Chest: Effort normal and breath sounds normal. No stridor. No respiratory distress. He has no wheezes.   Abdominal: Normal appearance.   Musculoskeletal: Normal range of motion.         General: Normal range of motion.   Neurological: He is alert and oriented to person, place, and time.   Skin: Skin is warm, dry, intact, not diaphoretic and not pale. No abrasion, No burn and No ecchymosis   Psychiatric: His speech is normal and behavior is normal. Mood, judgment and thought content normal.   Nursing note and vitals reviewed.    Results for orders placed or performed in visit on 06/16/22   POCT COVID-19 Rapid Screening   Result Value Ref Range    POC Rapid COVID Negative Negative     Acceptable Yes            Assessment:       1. Vomiting, intractability of vomiting not specified, presence of nausea not specified, unspecified vomiting type          Plan:         Vomiting, intractability of vomiting not specified, presence of nausea not specified, unspecified vomiting type  -     POCT COVID-19 Rapid Screening  -     ondansetron injection 4 mg  -     promethazine (PHENERGAN) 12.5 MG Tab; Take 1 tablet (12.5 mg total) by mouth every 8 (eight) hours as needed (nausea).  Dispense: 12 tablet; Refill: 0      - COVID negative on today's visit.  Patient able to tolerate p.o. fluids after IM  Zofran in clinic.  Discussed continue to hydrate at home, rest.  Will refill previously prescribed Phenergan, as patient states that that did help with symptoms.  Discussed follow-up with PCP for further evaluation.  Patient verbalized understanding and is in agreement with plan.    Patient Instructions   - You must understand that you have received an Urgent Care treatment only and that you may be released before all of your medical problems are known or treated.   - You, the patient, will arrange for follow up care as instructed.   - If your condition worsens or fails to improve we recommend that you receive another evaluation at the ER immediately or contact your PCP to discuss your concerns or return here.

## 2022-06-16 NOTE — LETTER
June 16, 2022      [facility] - Urgent Care  [address] [facility], [address]  [place] [address]  Phone: [phone]  Fax: [phone]       Patient: Galileo Johnson   YOB: 1995  Date of Visit: 06/16/2022    To Whom It May Concern:    Jarad Johnson  was at [facility] on 06/16/2022. The patient may return to work/school on 6/20/2022. If you have any questions or concerns, or if I can be of further assistance, please do not hesitate to contact me.    Sincerely,    Sergo Tenorio NP

## 2022-07-05 ENCOUNTER — OFFICE VISIT (OUTPATIENT)
Dept: URGENT CARE | Facility: CLINIC | Age: 27
End: 2022-07-05
Payer: MEDICAID

## 2022-07-05 VITALS
TEMPERATURE: 97 F | OXYGEN SATURATION: 98 % | HEART RATE: 65 BPM | BODY MASS INDEX: 17.61 KG/M2 | HEIGHT: 72 IN | DIASTOLIC BLOOD PRESSURE: 62 MMHG | RESPIRATION RATE: 16 BRPM | SYSTOLIC BLOOD PRESSURE: 116 MMHG | WEIGHT: 130 LBS

## 2022-07-05 DIAGNOSIS — R10.84 GENERALIZED ABDOMINAL PAIN: Primary | ICD-10-CM

## 2022-07-05 DIAGNOSIS — R10.31 RLQ ABDOMINAL PAIN: ICD-10-CM

## 2022-07-05 LAB
BILIRUB UR QL STRIP: NEGATIVE
GLUCOSE UR QL STRIP: NEGATIVE
KETONES UR QL STRIP: NEGATIVE
LEUKOCYTE ESTERASE UR QL STRIP: NEGATIVE
PH, POC UA: 6
POC BLOOD, URINE: NEGATIVE
POC NITRATES, URINE: NEGATIVE
PROT UR QL STRIP: NEGATIVE
SP GR UR STRIP: 1.02 (ref 1–1.03)
UROBILINOGEN UR STRIP-ACNC: NORMAL (ref 0.3–2.2)

## 2022-07-05 PROCEDURE — 3008F PR BODY MASS INDEX (BMI) DOCUMENTED: ICD-10-PCS | Mod: CPTII,S$GLB,, | Performed by: FAMILY MEDICINE

## 2022-07-05 PROCEDURE — 3008F BODY MASS INDEX DOCD: CPT | Mod: CPTII,S$GLB,, | Performed by: FAMILY MEDICINE

## 2022-07-05 PROCEDURE — 3074F PR MOST RECENT SYSTOLIC BLOOD PRESSURE < 130 MM HG: ICD-10-PCS | Mod: CPTII,S$GLB,, | Performed by: FAMILY MEDICINE

## 2022-07-05 PROCEDURE — 3078F PR MOST RECENT DIASTOLIC BLOOD PRESSURE < 80 MM HG: ICD-10-PCS | Mod: CPTII,S$GLB,, | Performed by: FAMILY MEDICINE

## 2022-07-05 PROCEDURE — 1160F PR REVIEW ALL MEDS BY PRESCRIBER/CLIN PHARMACIST DOCUMENTED: ICD-10-PCS | Mod: CPTII,S$GLB,, | Performed by: FAMILY MEDICINE

## 2022-07-05 PROCEDURE — 81003 URINALYSIS AUTO W/O SCOPE: CPT | Mod: QW,S$GLB,, | Performed by: FAMILY MEDICINE

## 2022-07-05 PROCEDURE — 3074F SYST BP LT 130 MM HG: CPT | Mod: CPTII,S$GLB,, | Performed by: FAMILY MEDICINE

## 2022-07-05 PROCEDURE — 3078F DIAST BP <80 MM HG: CPT | Mod: CPTII,S$GLB,, | Performed by: FAMILY MEDICINE

## 2022-07-05 PROCEDURE — 81003 POCT URINALYSIS, DIPSTICK, AUTOMATED, W/O SCOPE: ICD-10-PCS | Mod: QW,S$GLB,, | Performed by: FAMILY MEDICINE

## 2022-07-05 PROCEDURE — 1160F RVW MEDS BY RX/DR IN RCRD: CPT | Mod: CPTII,S$GLB,, | Performed by: FAMILY MEDICINE

## 2022-07-05 PROCEDURE — 1159F MED LIST DOCD IN RCRD: CPT | Mod: CPTII,S$GLB,, | Performed by: FAMILY MEDICINE

## 2022-07-05 PROCEDURE — 1159F PR MEDICATION LIST DOCUMENTED IN MEDICAL RECORD: ICD-10-PCS | Mod: CPTII,S$GLB,, | Performed by: FAMILY MEDICINE

## 2022-07-05 PROCEDURE — 99213 PR OFFICE/OUTPT VISIT, EST, LEVL III, 20-29 MIN: ICD-10-PCS | Mod: S$GLB,,, | Performed by: FAMILY MEDICINE

## 2022-07-05 PROCEDURE — 99213 OFFICE O/P EST LOW 20 MIN: CPT | Mod: S$GLB,,, | Performed by: FAMILY MEDICINE

## 2022-07-05 RX ORDER — DICYCLOMINE HYDROCHLORIDE 20 MG/1
20 TABLET ORAL
COMMUNITY
Start: 2022-03-24 | End: 2022-07-07

## 2022-07-05 NOTE — PATIENT INSTRUCTIONS

## 2022-07-05 NOTE — PROGRESS NOTES
Subjective:       Patient ID: Galileo Johnson is a 27 y.o. male.    Vitals:  height is 6' (1.829 m) and weight is 59 kg (130 lb). His temperature is 97.4 °F (36.3 °C). His blood pressure is 116/62 and his pulse is 65. His respiration is 16 and oxygen saturation is 98%.     Chief Complaint: Abdominal Pain    Pt is coming in today with abdominal pain that started Sunday. Pt states pain is unchanged. Pt states pain comes and goes. Entire abdominal region. Pain level 10. Pt states the pain radiates to his back. Pt states he had diarrhea yesterday about 3-4 times. Diarrhea subsided. No medication taken. Not covid vaccinated.   Pt is coming in today with abdominal pain. Symptoms started Sunday morning. Pt states his abdomina pain is unchanged. Pt states the pain comes and goes. Pt states its his entire abdominal, pain level 10. Pt states the pain raidates to his back. Pt states he was having diarrhea last night. Pt states he went about 3-4 times. Pt states diarrhea subsided. No medication taken. Not covid vaccinated. Pt declined any testing   Provider note begins below:  Pt reports he has generalized abd pain that is intermittent, comes in waves since Sunday, his fiance here with diarrhea. He was playing [employer] pong on Saturday, denies any similar pain in the past, has hx of IBS, but this pain is different. Denies any hx of abd surgeries, he had diarrhea this morning, also had an episode of emesis. Describes as bubbling at times. Denies any  complaints.       Abdominal Pain  Episode onset: 3 days  The onset quality is sudden. The problem occurs 2 to 4 times per day. The problem has been unchanged. The pain is located in the generalized abdominal region. The pain is at a severity of 10/10. The pain is moderate. The quality of the pain is aching. The abdominal pain radiates to the back. Associated symptoms include diarrhea and vomiting. Pertinent negatives include no anorexia, arthralgias, belching, constipation, dysuria,  fever, flatus, frequency, headaches, hematochezia, hematuria, melena, myalgias, nausea or weight loss. Nothing aggravates the pain. The pain is relieved by nothing. He has tried nothing for the symptoms. There is no history of abdominal surgery, colon cancer, Crohn's disease, gallstones, GERD, irritable bowel syndrome, pancreatitis, PUD or ulcerative colitis. Patient's medical history does not include kidney stones and UTI.       Abdominal Pain  This is a new problem. Episode onset: 3 days  The onset quality is sudden. The problem occurs constantly. The problem has been unchanged. The pain is located in the generalized abdominal region. The pain is at a severity of 10/10. The pain is severe. The quality of the pain is aching. The abdominal pain radiates to the back. Associated symptoms include diarrhea. Pertinent negatives include no constipation, fever, hematochezia, nausea or vomiting. Nothing aggravates the pain. The pain is relieved by nothing. He has tried nothing for the symptoms. There is no history of abdominal surgery.       Constitution: Negative for activity change, appetite change, chills, sweating, fatigue, fever, unexpected weight change, generalized weakness and international travel in last 60 days.   Gastrointestinal: Positive for abdominal pain and diarrhea. Negative for abdominal trauma, abdominal bloating, history of abdominal surgery, nausea, vomiting, constipation, bright red blood in stool, dark colored stools, rectal bleeding, rectal pain, hemorrhoids, heartburn and bowel incontinence.       Objective:      Physical Exam   Constitutional: He is oriented to person, place, and time. He appears well-developed.  Non-toxic appearance. He does not appear ill. No distress.      Comments:Appears uncomfortable. Holding lower abd when ambulating. Points to pain near umbilicus for pain.      HENT:   Head: Normocephalic and atraumatic.   Ears:   Right Ear: External ear normal.   Left Ear: External ear  normal.   Nose: Nose normal.   Mouth/Throat: Oropharynx is clear and moist.   Eyes: Conjunctivae, EOM and lids are normal. Pupils are equal, round, and reactive to light.   Neck: Trachea normal and phonation normal. Neck supple.   Abdominal: Bowel sounds are normal. Soft. flat abdomen There is abdominal tenderness in the right lower quadrant, epigastric area and periumbilical area. There is guarding. There is no rebound, no tenderness at McBurney's point, negative Jauregui's sign, no left CVA tenderness, negative Rovsing's sign, negative psoas sign, no right CVA tenderness and negative obturator sign.   Musculoskeletal: Normal range of motion.         General: Normal range of motion.   Neurological: He is alert and oriented to person, place, and time.   Skin: Skin is warm, dry, intact and not diaphoretic.   Psychiatric: His speech is normal and behavior is normal. Judgment and thought content normal.   Nursing note and vitals reviewed.        Assessment:       1. Generalized abdominal pain    2. RLQ abdominal pain        Results for orders placed or performed in visit on 07/05/22   POCT Urinalysis, Dipstick, Automated, W/O Scope   Result Value Ref Range    POC Blood, Urine Negative Negative    POC Bilirubin, Urine Negative Negative    POC Urobilinogen, Urine abnormal 0.3 - 2.2    POC Ketones, Urine Negative Negative    POC Protein, Urine Negative Negative    POC Nitrates, Urine Negative Negative    POC Glucose, Urine Negative Negative    pH, UA 6.0     POC Specific Gravity, Urine 1.020 1.003 - 1.029    POC Leukocytes, Urine Negative Negative      Plan:       I stressed to patient that I am very concerned for acute appendicitis, he is refusing to go to the emergency department for further evaluation.  He says he will go if he gets any worse.  I have stressed again my concerns for appendicitis, and given great detail of signs of appendicitis in detail he verbalized understanding.  And again refuses to go to the  emergency department.  He is of sound mind.  He is with his fiancee at this time.    Discussed results/diagnosis/plan with patient in clinic. Strict precautions given to patient to monitor for worsening signs and symptoms. Advised to follow up with PCP or specialist.    Explained side effects of medications prescribed with patient and informed him/her to discontinue use if he/she has any side effects and to inform UC or PCP if this occurs. All questions answered. Strict ED verses clinic return precautions stressed and given in depth. Advised if symptoms worsens of fail to improve he/she should go to the Emergency Room. Discharge and follow-up instructions given verbally/printed with the patient who expressed understanding and willingness to comply with my recommendations. Patient voiced understanding and in agreement with current treatment plan. Patient exits the exam room in no acute distress. Conversant and engaged during discharge discussion, verbalized understanding.      Generalized abdominal pain  -     POCT Urinalysis, Dipstick, Automated, W/O Scope    RLQ abdominal pain                 Patient Instructions   General Discharge Instructions   PLEASE READ YOUR DISCHARGE INSTRUCTIONS ENTIRELY AS IT CONTAINS IMPORTANT INFORMATION.  If you were prescribed a narcotic or controlled medication, do not drive or operate heavy equipment or machinery while taking these medications.  If you were prescribed antibiotics, please take them to completion.  You must understand that you've received an Urgent Care treatment only and that you may be released before all your medical problems are known or treated. You, the patient, will arrange for follow up care as instructed.    OVER THE COUNTER RECOMMENDATIONS/SUGGESTIONS.    Make sure to stay well hydrated.    Use Nasal Saline to mechanically move any post nasal drip from your eustachian tube or from the back of your throat.    Use warm salt water gargles to ease your throat  pain. Warm salt water gargles as needed for sore throat- 1/2 tsp salt to 1 cup warm water, gargle as desired.    Use an antihistamine such as Claritin, Zyrtec or Allegra to dry you out.    Use pseudoephedrine (behind the counter) to decongest. Pseudoephedrine 30 mg up to 240 mg /day. It can raise your blood pressure and give you palpitations.    Use mucinex (guaifenesin) to break up mucous up to 2400mg/day to loosen any mucous.    The mucinex DM pill has a cough suppressant that can be sedating. It can be used at night to stop the tickle at the back of your throat.    You can use Mucinex D (it has guaifenesin and a high dose of pseudoephedrine) in the mornings to help decongest.    Use Afrin in each nare for no longer than 3 days, as it is addictive. It can also dry out your mucous membranes and cause elevated blood pressure. This is especially useful if you are flying.    Use Flonase 1-2 sprays/nostril per day. It is a local acting steroid nasal spray, if you develop a bloody nose, stop using the medication immediately.    Sometimes Nyquil at night is beneficial to help you get some rest, however it is sedating and it does have an antihistamine, and tylenol.    Honey is a natural cough suppressant that can be used.    Tylenol up to 4,000 mg a day is safe for short periods and can be used for body aches, pain, and fever. However in high doses and prolonged use it can cause liver irritation.    Ibuprofen is a non-steroidal anti-inflammatory that can be used for body aches, pain, and fever.However it can also cause stomach irritation if over used.     Follow up with your PCP or specialty clinic as instructed in the next 2-3 days if not improved or as needed. You can call [phone] to schedule an appointment with appropriate provider.      If you condition worsens, we recommend that you receive another evaluation at the emergency room immediately or contact your primary medical clinic's after hours call service to  discuss your concerns.      Please return here or go to the Emergency Department for any concerns or worsening condition.   You can also call [phone] to schedule an appointment with the appropriate provider.    Please return here or go to the Emergency Department for any concerns or worsening of condition.    Thank you for choosing [facility]!    Our goal in the Urgent Care is to always provide outstanding medical care. You may receive a survey by mail or e-mail in the next week regarding your experience today. We would greatly appreciate you completing and returning the survey. Your feedback provides us with a way to recognize our staff who provide very good care, and it helps us learn how to improve when your experience was below our aspiration of excellence.      We appreciate you trusting us with your medical care. We hope you feel better soon. We will be happy to take care of you for all of your future medical needs.    Sincerely,    EDDIE Howe  Abdominal Pain   If your condition worsens or fails to improve we recommend that you receive another evaluation at the ER immediately or contact your PCP to discuss your concerns or return here. You must understand that you've received an urgent care treatment only and that you may be released before all your medical problems are known or treated. You the patient will arrange for followup care as instructed.   Watch for any increase pain, fever, localized pain to right lower abdomen or continued vomiting or diarrhea.   If you have diarrhea you can use Pepto Bismol; Avoid Immodium

## 2022-07-05 NOTE — PROGRESS NOTES
Subjective:       Patient ID: Galileo Johnson is a 27 y.o. male.    Chief Complaint: Abdominal Pain    Pt is coming in today with abdominal pain. Symptoms started Sunday morning. Pt states his abdomina pain is unchanged. Pt states the pain comes and goes. Pt states its his entire abdominal, pain level 10. Pt states the pain raidates to his back. Pt states he was having diarrhea last night. Pt states he went about 3-4 times. Pt states diarrhea subsided. No medication taken. Not covid vaccinated. Pt declined any testing   Provider note begins below:  Pt reports he has generalized abd pain that is intermittent, comes in waves since Sunday, his fiance here with diarrhea. He was playing [employer] on Saturday, denies any similar pain in the past, has hx of IBS, but this pain is different. Denies any hx of abd surgeries, he had diarrhea this morning, also had an episode of emesis. Describes as bubbling at times. Denies any  complaints.       Abdominal Pain  Episode onset: 3 days  The onset quality is sudden. The problem occurs 2 to 4 times per day. The problem has been unchanged. The pain is located in the generalized abdominal region. The pain is at a severity of 10/10. The pain is moderate. The quality of the pain is aching. The abdominal pain radiates to the back. Associated symptoms include diarrhea and vomiting. Pertinent negatives include no anorexia, arthralgias, belching, constipation, dysuria, fever, flatus, frequency, headaches, hematochezia, hematuria, melena, myalgias, nausea or weight loss. Nothing aggravates the pain. The pain is relieved by nothing. He has tried nothing for the symptoms. There is no history of abdominal surgery, colon cancer, Crohn's disease, gallstones, GERD, irritable bowel syndrome, pancreatitis, PUD or ulcerative colitis. Patient's medical history does not include kidney stones and UTI.       Constitution: Negative for activity change, appetite change, chills, sweating, fatigue,  fever, unexpected weight change and generalized weakness.   Gastrointestinal: Positive for abdominal pain, vomiting and diarrhea. Negative for abdominal trauma, abdominal bloating, history of abdominal surgery, nausea, constipation, bright red blood in stool, dark colored stools, rectal bleeding, rectal pain, hemorrhoids, heartburn and bowel incontinence.   Genitourinary: Negative for dysuria, frequency and hematuria.   Musculoskeletal: Negative for joint pain and muscle ache.   Neurological: Negative for headaches.        Objective:      Physical Exam  Constitutional:       General: He is not in acute distress.     Appearance: He is not ill-appearing, toxic-appearing or diaphoretic.   HENT:      Head: Normocephalic and atraumatic.   Cardiovascular:      Rate and Rhythm: Normal rate and regular rhythm.   Pulmonary:      Effort: Pulmonary effort is normal.      Breath sounds: Normal breath sounds.   Abdominal:      General: Abdomen is flat.      Palpations: Abdomen is soft.      Tenderness: There is abdominal tenderness in the right lower quadrant, epigastric area and periumbilical area. There is guarding and rebound. There is no right CVA tenderness or left CVA tenderness. Negative signs include Jauregui's sign, Rovsing's sign, McBurney's sign, psoas sign and obturator sign.      Hernia: No hernia is present.   Musculoskeletal:      Cervical back: Normal range of motion.         Assessment:       1. Generalized abdominal pain        Vitals:    07/05/22 1345   BP: 116/62   Pulse: 65   Resp: 16   Temp: 97.4 °F (36.3 °C)       Plan:                   No follow-ups on file.

## 2022-07-05 NOTE — LETTER
July 5, 2022      [facility] - Urgent Care  [address] [facility], [address]  [place] [address]  Phone: [phone]  Fax: [phone]       Patient: Galileo Johnson   YOB: 1995  Date of Visit: 07/05/2022    To Whom It May Concern:    Jarad Johnson  was at [facility] on 07/05/2022. The patient may return to work/school on 7/7/2022 with no restrictions. If you have any questions or concerns, or if I can be of further assistance, please do not hesitate to contact me.    Sincerely,    Beba Gaines NP

## 2022-07-05 NOTE — PROGRESS NOTES
Subjective:       Patient ID: Galileo Johnson is a 27 y.o. male.    Vitals:  height is 6' (1.829 m) and weight is 59 kg (130 lb). His temperature is 97.4 °F (36.3 °C). His blood pressure is 116/62 and his pulse is 65. His respiration is 16 and oxygen saturation is 98%.     Chief Complaint: Abdominal Pain    Pt is coming in today with abdominal pain that started Sunday.     Abdominal Pain        Gastrointestinal: Positive for abdominal pain.       Objective:      Physical Exam      Assessment:       1. Generalized abdominal pain          Plan:         Generalized abdominal pain

## 2022-07-07 ENCOUNTER — HOSPITAL ENCOUNTER (EMERGENCY)
Facility: HOSPITAL | Age: 27
Discharge: HOME | End: 2022-07-07
Attending: EMERGENCY MEDICINE
Payer: MEDICAID

## 2022-07-07 VITALS
BODY MASS INDEX: 17.23 KG/M2 | RESPIRATION RATE: 16 BRPM | HEIGHT: 73 IN | SYSTOLIC BLOOD PRESSURE: 110 MMHG | WEIGHT: 130 LBS | HEART RATE: 63 BPM | DIASTOLIC BLOOD PRESSURE: 58 MMHG | OXYGEN SATURATION: 99 % | TEMPERATURE: 98 F

## 2022-07-07 DIAGNOSIS — R19.7 NAUSEA, VOMITING, AND DIARRHEA: ICD-10-CM

## 2022-07-07 DIAGNOSIS — R11.2 NAUSEA, VOMITING, AND DIARRHEA: ICD-10-CM

## 2022-07-07 DIAGNOSIS — R10.84 GENERALIZED ABDOMINAL PAIN: Primary | ICD-10-CM

## 2022-07-07 DIAGNOSIS — Z87.19 HISTORY OF IBS: ICD-10-CM

## 2022-07-07 DIAGNOSIS — K52.9 ENTERITIS: ICD-10-CM

## 2022-07-07 LAB
ALBUMIN SERPL BCP-MCNC: 3.9 G/DL (ref 3.5–5.2)
ALP SERPL-CCNC: 57 U/L (ref 55–135)
ALT SERPL W/O P-5'-P-CCNC: 7 U/L (ref 10–44)
ANION GAP SERPL CALC-SCNC: 6 MMOL/L (ref 8–16)
AST SERPL-CCNC: 12 U/L (ref 10–40)
BACTERIA #/AREA URNS HPF: ABNORMAL /HPF
BASOPHILS # BLD AUTO: 0.05 K/UL (ref 0–0.2)
BASOPHILS NFR BLD: 0.7 % (ref 0–1.9)
BILIRUB SERPL-MCNC: 0.7 MG/DL (ref 0.1–1)
BILIRUB UR QL STRIP: NEGATIVE
BUN SERPL-MCNC: 10 MG/DL (ref 6–20)
CALCIUM SERPL-MCNC: 9.2 MG/DL (ref 8.7–10.5)
CAOX CRY URNS QL MICRO: ABNORMAL
CHLORIDE SERPL-SCNC: 104 MMOL/L (ref 95–110)
CLARITY UR: ABNORMAL
CO2 SERPL-SCNC: 31 MMOL/L (ref 23–29)
COLOR UR: YELLOW
CREAT SERPL-MCNC: 1.2 MG/DL (ref 0.5–1.4)
DIFFERENTIAL METHOD: ABNORMAL
EOSINOPHIL # BLD AUTO: 0.9 K/UL (ref 0–0.5)
EOSINOPHIL NFR BLD: 12.9 % (ref 0–8)
ERYTHROCYTE [DISTWIDTH] IN BLOOD BY AUTOMATED COUNT: 12.2 % (ref 11.5–14.5)
EST. GFR  (AFRICAN AMERICAN): >60 ML/MIN/1.73 M^2
EST. GFR  (NON AFRICAN AMERICAN): >60 ML/MIN/1.73 M^2
GLUCOSE SERPL-MCNC: 95 MG/DL (ref 70–110)
GLUCOSE UR QL STRIP: NEGATIVE
HCT VFR BLD AUTO: 45.7 % (ref 40–54)
HGB BLD-MCNC: 15.6 G/DL (ref 14–18)
HGB UR QL STRIP: NEGATIVE
HYALINE CASTS #/AREA URNS LPF: 0 /LPF
IMM GRANULOCYTES # BLD AUTO: 0.01 K/UL (ref 0–0.04)
IMM GRANULOCYTES NFR BLD AUTO: 0.1 % (ref 0–0.5)
KETONES UR QL STRIP: NEGATIVE
LEUKOCYTE ESTERASE UR QL STRIP: NEGATIVE
LIPASE SERPL-CCNC: 6 U/L (ref 4–60)
LYMPHOCYTES # BLD AUTO: 2 K/UL (ref 1–4.8)
LYMPHOCYTES NFR BLD: 28.4 % (ref 18–48)
MCH RBC QN AUTO: 29.9 PG (ref 27–31)
MCHC RBC AUTO-ENTMCNC: 34.1 G/DL (ref 32–36)
MCV RBC AUTO: 88 FL (ref 82–98)
MICROSCOPIC COMMENT: ABNORMAL
MONOCYTES # BLD AUTO: 0.4 K/UL (ref 0.3–1)
MONOCYTES NFR BLD: 5.7 % (ref 4–15)
NEUTROPHILS # BLD AUTO: 3.8 K/UL (ref 1.8–7.7)
NEUTROPHILS NFR BLD: 52.2 % (ref 38–73)
NITRITE UR QL STRIP: NEGATIVE
NRBC BLD-RTO: 0 /100 WBC
PH UR STRIP: 6 [PH] (ref 5–8)
PLATELET # BLD AUTO: 193 K/UL (ref 150–450)
PMV BLD AUTO: 8.7 FL (ref 9.2–12.9)
POTASSIUM SERPL-SCNC: 4.1 MMOL/L (ref 3.5–5.1)
PROT SERPL-MCNC: 7 G/DL (ref 6–8.4)
PROT UR QL STRIP: ABNORMAL
RBC # BLD AUTO: 5.22 M/UL (ref 4.6–6.2)
RBC #/AREA URNS HPF: 6 /HPF (ref 0–4)
SODIUM SERPL-SCNC: 141 MMOL/L (ref 136–145)
SP GR UR STRIP: >1.03 (ref 1–1.03)
URN SPEC COLLECT METH UR: ABNORMAL
UROBILINOGEN UR STRIP-ACNC: NEGATIVE EU/DL
WBC # BLD AUTO: 7.19 K/UL (ref 3.9–12.7)
WBC #/AREA URNS HPF: 2 /HPF (ref 0–5)

## 2022-07-07 PROCEDURE — 25500020 PHARM REV CODE 255: Performed by: EMERGENCY MEDICINE

## 2022-07-07 PROCEDURE — 96375 TX/PRO/DX INJ NEW DRUG ADDON: CPT

## 2022-07-07 PROCEDURE — 96372 THER/PROPH/DIAG INJ SC/IM: CPT | Performed by: NURSE PRACTITIONER

## 2022-07-07 PROCEDURE — 63600175 PHARM REV CODE 636 W HCPCS: Performed by: NURSE PRACTITIONER

## 2022-07-07 PROCEDURE — 80053 COMPREHEN METABOLIC PANEL: CPT | Performed by: NURSE PRACTITIONER

## 2022-07-07 PROCEDURE — 83690 ASSAY OF LIPASE: CPT | Performed by: NURSE PRACTITIONER

## 2022-07-07 PROCEDURE — 99285 EMERGENCY DEPT VISIT HI MDM: CPT | Mod: 25

## 2022-07-07 PROCEDURE — 85025 COMPLETE CBC W/AUTO DIFF WBC: CPT | Performed by: NURSE PRACTITIONER

## 2022-07-07 PROCEDURE — 96361 HYDRATE IV INFUSION ADD-ON: CPT

## 2022-07-07 PROCEDURE — 25000003 PHARM REV CODE 250: Performed by: NURSE PRACTITIONER

## 2022-07-07 PROCEDURE — 96374 THER/PROPH/DIAG INJ IV PUSH: CPT | Mod: 59

## 2022-07-07 PROCEDURE — 81000 URINALYSIS NONAUTO W/SCOPE: CPT | Performed by: EMERGENCY MEDICINE

## 2022-07-07 RX ORDER — DICYCLOMINE HYDROCHLORIDE 10 MG/ML
20 INJECTION INTRAMUSCULAR
Status: COMPLETED | OUTPATIENT
Start: 2022-07-07 | End: 2022-07-07

## 2022-07-07 RX ORDER — ONDANSETRON 2 MG/ML
4 INJECTION INTRAMUSCULAR; INTRAVENOUS
Status: COMPLETED | OUTPATIENT
Start: 2022-07-07 | End: 2022-07-07

## 2022-07-07 RX ORDER — DICYCLOMINE HYDROCHLORIDE 20 MG/1
20 TABLET ORAL 2 TIMES DAILY
Qty: 20 TABLET | Refills: 0 | Status: SHIPPED | OUTPATIENT
Start: 2022-07-07 | End: 2022-07-17

## 2022-07-07 RX ORDER — NAPROXEN 500 MG/1
500 TABLET ORAL EVERY 12 HOURS
Qty: 10 TABLET | Refills: 0 | Status: SHIPPED | OUTPATIENT
Start: 2022-07-07 | End: 2022-07-12

## 2022-07-07 RX ORDER — KETOROLAC TROMETHAMINE 30 MG/ML
10 INJECTION, SOLUTION INTRAMUSCULAR; INTRAVENOUS
Status: COMPLETED | OUTPATIENT
Start: 2022-07-07 | End: 2022-07-07

## 2022-07-07 RX ORDER — ONDANSETRON 4 MG/1
4 TABLET, ORALLY DISINTEGRATING ORAL EVERY 6 HOURS PRN
Qty: 12 TABLET | Refills: 0 | Status: SHIPPED | OUTPATIENT
Start: 2022-07-07 | End: 2022-07-14

## 2022-07-07 RX ADMIN — DICYCLOMINE HYDROCHLORIDE 20 MG: 20 INJECTION INTRAMUSCULAR at 09:07

## 2022-07-07 RX ADMIN — IOHEXOL 75 ML: 350 INJECTION, SOLUTION INTRAVENOUS at 10:07

## 2022-07-07 RX ADMIN — ONDANSETRON 4 MG: 2 INJECTION INTRAMUSCULAR; INTRAVENOUS at 09:07

## 2022-07-07 RX ADMIN — SODIUM CHLORIDE 1000 ML: 0.9 INJECTION, SOLUTION INTRAVENOUS at 09:07

## 2022-07-07 RX ADMIN — KETOROLAC TROMETHAMINE 10 MG: 30 INJECTION, SOLUTION INTRAMUSCULAR at 09:07

## 2022-07-07 NOTE — ED PROVIDER NOTES
Encounter Date: 7/7/2022    SCRIBE #1 NOTE: I, Karena Contreras, am scribing for, and in the presence of,  EULALIA Johns. I have scribed the following portions of the note - Other sections scribed: HPI, ROS, PE.       History     Chief Complaint   Patient presents with    Abdominal Pain     Pt reports lower abdominal pain since Sunday with diarrhea, nausea, and vomiting. Pt reports one episode of vomiting today and two episodes of diarrhea. Denies any medical hx. Reports fever two days ago.     CC: Abdominal pain    HPI: This is a 27 y.o.male patient, with a PMHx of IBS, presenting to the ED for further evaluation of intermittent generalized abdominal pain worsening in the periumbilical region beginning 4 days ago. Patient reports associated symptoms of nausea, vomiting and diarrhea. Patient states he has had an episode of emesis today along with 2 episodes of diarrhea. Patient also reports he has not eaten anything in the last 3 days and reports drinking water this morning at 6:30 AM. Patient states he had a fever of 101 degrees Farenheit which is now resolved. Patient reports he took Gas-X to help alleviate the symptoms but there was no relief. Patient denies any chills, shortness of breath, chest pain, neck pain, back pain, rash, headaches, congestion, rhinorrhea, cough, sore throat, ear pain, eye pain, blurred vision, dysuria, or any other associated symptoms. No alleviating or aggravating factors. No known drug allergies.  Has not been seen by GI and several years.  Follows up with his primary care doctor, last visit approximate 2 weeks ago.          Review of patient's allergies indicates:  No Known Allergies  Past Medical History:   Diagnosis Date    IBS (irritable bowel syndrome)      No past surgical history on file.  Family History   Problem Relation Age of Onset    Heart disease Father     Diabetes Maternal Grandfather     Hypertension Maternal Grandfather     Drug abuse Paternal Grandmother       Social History     Tobacco Use    Smoking status: Former Smoker     Packs/day: 2.00     Types: Cigarettes    Smokeless tobacco: Never Used   Substance Use Topics    Alcohol use: Not Currently    Drug use: Yes     Types: Marijuana     Review of Systems   Constitutional: Positive for appetite change (decreased) and fever (resolved). Negative for chills.   HENT: Negative for congestion, ear discharge, ear pain, rhinorrhea, sore throat and trouble swallowing.    Eyes: Negative for visual disturbance.   Respiratory: Negative for cough and shortness of breath.    Cardiovascular: Negative for chest pain and leg swelling.   Gastrointestinal: Positive for abdominal pain, diarrhea, nausea and vomiting.   Genitourinary: Negative for dysuria.   Musculoskeletal: Negative for back pain, neck pain and neck stiffness.   Skin: Negative for color change, rash and wound.   Neurological: Negative for seizures, syncope, speech difficulty, weakness and headaches.   Psychiatric/Behavioral: Negative for confusion.       Physical Exam     Initial Vitals [07/07/22 0734]   BP Pulse Resp Temp SpO2   120/70 73 16 97.9 °F (36.6 °C) 98 %      MAP       --         Physical Exam    Nursing note and vitals reviewed.  Constitutional: He appears well-developed and well-nourished. He is not diaphoretic. He is cooperative.  Non-toxic appearance. He does not have a sickly appearance. He does not appear ill. No distress.   HENT:   Head: Normocephalic and atraumatic.   Right Ear: External ear normal.   Left Ear: External ear normal.   Nose: Nose normal.   Mouth/Throat: Uvula is midline, oropharynx is clear and moist and mucous membranes are normal. No trismus in the jaw. Abnormal dentition. Dental caries present. No oropharyngeal exudate, posterior oropharyngeal edema, posterior oropharyngeal erythema or tonsillar abscesses.   Eyes: Conjunctivae and EOM are normal. Pupils are equal, round, and reactive to light. No scleral icterus.   Neck:  Phonation normal. Neck supple.   Normal range of motion.  Cardiovascular: Normal rate, regular rhythm and intact distal pulses.   Pulses:       Radial pulses are 2+ on the right side and 2+ on the left side.        Dorsalis pedis pulses are 2+ on the right side and 2+ on the left side.   Pulmonary/Chest: Effort normal and breath sounds normal. No tachypnea and no bradypnea. No respiratory distress.   Abdominal: Abdomen is soft and flat. Bowel sounds are normal. He exhibits no distension. There is generalized abdominal tenderness. There is guarding.   Musculoskeletal:         General: No edema. Normal range of motion.      Cervical back: Normal range of motion and neck supple. No rigidity. Normal range of motion.     Neurological: He is alert and oriented to person, place, and time. No sensory deficit. He exhibits normal muscle tone. Coordination and gait normal. GCS score is 15. GCS eye subscore is 4. GCS verbal subscore is 5. GCS motor subscore is 6.   Skin: Skin is warm and dry. Capillary refill takes less than 2 seconds. No bruising and no rash noted. No erythema.   Psychiatric: He has a normal mood and affect. His behavior is normal. Judgment and thought content normal.         ED Course   Procedures  Labs Reviewed   URINALYSIS - Abnormal; Notable for the following components:       Result Value    Appearance, UA Hazy (*)     Specific Gravity, UA >1.030 (*)     Protein, UA 1+ (*)     All other components within normal limits   CBC W/ AUTO DIFFERENTIAL - Abnormal; Notable for the following components:    MPV 8.7 (*)     Eos # 0.9 (*)     Eosinophil % 12.9 (*)     All other components within normal limits   COMPREHENSIVE METABOLIC PANEL - Abnormal; Notable for the following components:    CO2 31 (*)     ALT 7 (*)     Anion Gap 6 (*)     All other components within normal limits   URINALYSIS MICROSCOPIC - Abnormal; Notable for the following components:    RBC, UA 6 (*)     Ca Oxalate Arely, UA Many (*)     All other  components within normal limits   LIPASE          Imaging Results           CT Abdomen Pelvis With Contrast (Final result)  Result time 07/07/22 11:22:49    Final result by Gaurang Lazo MD (07/07/22 11:22:49)                 Impression:      Significant small bowel wall thickening most pronounced in the lower abdomen and right lower quadrant suggestive of a nonspecific enteritis.  Associated diffuse mesenteric edema, prominent mesenteric lymph nodes, and small volume abdominopelvic free fluid.    This report was flagged in Epic as abnormal.      Electronically signed by: Gaurang Lazo MD  Date:    07/07/2022  Time:    11:22             Narrative:    EXAMINATION:  CT ABDOMEN PELVIS WITH CONTRAST    CLINICAL HISTORY:  Abdominal pain, acute, nonlocalized;    TECHNIQUE:  Low dose axial images, sagittal and coronal reformations were obtained from the lung bases to the pubic symphysis following the IV administration of 75 mL of Omnipaque 350 .  Oral contrast was not given.    COMPARISON:  CT abdomen pelvis with contrast, 10/19/2019.    FINDINGS:  Lower Chest:    Lung bases are clear.  Heart size is normal.    Abdomen:    Liver is normal in size and contour.  No focal hepatic lesion.  Gallbladder is unremarkable. No intrahepatic biliary ductal dilatation.    Spleen, adrenals, and pancreas are unremarkable.    The kidneys are symmetric.  No hydronephrosis.    No small bowel obstruction.  There is diffuse small bowel wall thickening most pronounced in the lower abdomen and right lower quadrant.  Appendix is identified in infrahepatic location and contains air in the lumen and is not significantly distended.  Scattered colonic diverticula.  Diffuse mesenteric edema and small volume abdominopelvic free fluid.  No pneumoperitoneum or organized fluid collection.  There are few mildly enlarged mesenteric lymph nodes measuring up to 9 mm in diameter (axial image 77).  No portal venous gas.    No bulky retroperitoneal  lymphadenopathy.    Abdominal aorta is normal in caliber.    Portal vein is unremarkable.    Pelvis:    Urinary bladder is decompressed and not well evaluated.  Rectum is unremarkable.  There is moderate volume pelvic free fluid.  No pelvic lymphadenopathy.    Bones and soft tissues:    No aggressive osseous lesions.  Extraperitoneal soft tissues are negative for acute finding.                                 Medications   ketorolac injection 9.999 mg (9.999 mg Intravenous Given 7/7/22 0945)   dicyclomine injection 20 mg (20 mg Intramuscular Given 7/7/22 0948)   ondansetron injection 4 mg (4 mg Intravenous Given 7/7/22 0946)   sodium chloride 0.9% bolus 1,000 mL (0 mLs Intravenous Stopped 7/7/22 1124)   iohexoL (OMNIPAQUE 350) injection 75 mL (75 mLs Intravenous Given 7/7/22 1023)     Medical Decision Making:   History:   Old Medical Records: I decided to obtain old medical records.  Clinical Tests:   Lab Tests: Reviewed and Ordered  Radiological Study: Ordered and Reviewed       APC / Resident Notes:   This is an evaluation of a 27 y.o. male that presents to the Emergency Department for Abdominal Pain. Physical Exam shows a non-toxic, afebrile, and well appearing male.  Diffuse abdominal tenderness throughout the abdomen.  Mild voluntary guarding. Vital Signs Are Reassuring. RESULTS:  CBC without leukocytosis or anemia.  Normal platelet count.  CMP with a CO2 31, ALT 7, anion gap 6, otherwise unremarkable.  Normal lipase.  Urinalysis without infection.  CT abdomen pelvis with small bowel thickening suggestive of nonspecific enteritis.  Mesenteric edema and prominent lymph nodes.  Small volume of free fluid in the abdominal pelvic region.  Patient was discussed with including CT results reviewed by my attending, Dr. Tello.  Will plan to discharge home with close outpatient follow-up.     My overall impression is generalized abdominal pain with nausea, vomiting, diarrhea-suspect enteritis. Given the exam and  laboratory studies, I considered, but at this time, do not suspect an appendicitis, ischemic bowel, bowel perforation, bowel obstruction,  pancreatitis, UTI, pyelonephritis, kidney stone, diverticulitis, or cholecystitis.     Reassessment:  Prior to discharge, patient reports pain slightly improved after medication, offered additional stronger medication however he drove.  He preferred to be discharged home with additional medication.  D/C Meds:  Bentyl, naproxen, Zofran. Additional D/C Information: Abdominal Pain Precautions, importance of GI follow-up. The diagnosis, treatment plan, instructions for follow-up and reevaluation with his PCP and/or GI as well as ED return precautions were discussed and understanding was verbalized. All questions or concerns have been addressed. This case was discussed with Dr. Tello. SAM Lawrence FNP-C Scribe Attestation:   Scribe #1: I performed the above scribed service and the documentation accurately describes the services I performed. I attest to the accuracy of the note.                 Clinical Impression:   Final diagnoses:  [R11.2, R19.7] Nausea, vomiting, and diarrhea  [R10.84] Generalized abdominal pain (Primary)  [K52.9] Enteritis  [Z87.19] History of IBS          ED Disposition Condition    Discharge Stable        ED Prescriptions     Medication Sig Dispense Start Date End Date Auth. Provider    dicyclomine (BENTYL) 20 mg tablet Take 1 tablet (20 mg total) by mouth 2 (two) times daily. for 10 days 20 tablet 7/7/2022 7/17/2022 EULALIA Johns    ondansetron (ZOFRAN-ODT) 4 MG TbDL Take 1 tablet (4 mg total) by mouth every 6 (six) hours as needed (Nausea/vomiting). 12 tablet 7/7/2022 7/14/2022 EULALIA Johns    naproxen (NAPROSYN) 500 MG tablet Take 1 tablet (500 mg total) by mouth every 12 (twelve) hours. Do not take any additional NSAIDs while you are taking this medication including (Advil, Aleve, Motrin, Ibuprofen, Mobic\meloxicam, Naprosyn,  Toradol, ketoralac, etc.). for 5 days 10 tablet 7/7/2022 7/12/2022 EULALIA Johns        Follow-up Information     Follow up With Specialties Details Why Contact Info    Your Primary Care Doctor  Schedule an appointment as soon as possible for a visit  Please call and schedule an appointment for follow up this week.     Gaye Chávez MD Gastroenterology Call in 1 day To discuss your ED visit & schedule follow-up [address]  [facility] [address]  [phone]      [facility] Emergency Dept Emergency Medicine Go to  If symptoms worsen [address]  [facility] [id number] [phone]         IMCKENNA APRN, JALILP-C, personally performed the services described in this documentation. All medical record entries made by the scribe were at my direction and in my presence. I have reviewed the chart and agree that the record reflects my personal performance and is accurate and complete.       EULALIA Johns  07/07/22 2770

## 2022-07-07 NOTE — DISCHARGE INSTRUCTIONS
Please return to the Emergency Department for any new or worsening symptoms including: worsening abdominal pain, dark\black\bloody bowel movements, vomiting blood, hard abdomen, fever, chest pain, shortness of breath, loss of consciousness or any other concerns.     Please follow up with your Primary Care Provider within in the week. If you do not have a Primary Care Provider, you may contact the one listed on your discharge paperwork or you may also call the [facility] Appointment Desk at [phone] to schedule an appointment with a Primary Care Provider.

## 2022-07-07 NOTE — Clinical Note
"Galileo Sommer" Elizabeth was seen and treated in our emergency department on 7/7/2022.  He may return to work on 07/11/2022.       If you have any questions or concerns, please don't hesitate to call.      EULALIA Johns"

## 2022-10-31 ENCOUNTER — OFFICE VISIT (OUTPATIENT)
Dept: URGENT CARE | Facility: CLINIC | Age: 27
End: 2022-10-31
Payer: MEDICAID

## 2022-10-31 VITALS
HEIGHT: 73 IN | WEIGHT: 130 LBS | TEMPERATURE: 99 F | OXYGEN SATURATION: 98 % | BODY MASS INDEX: 17.23 KG/M2 | HEART RATE: 78 BPM | DIASTOLIC BLOOD PRESSURE: 68 MMHG | RESPIRATION RATE: 16 BRPM | SYSTOLIC BLOOD PRESSURE: 111 MMHG

## 2022-10-31 DIAGNOSIS — J11.1 INFLUENZA: Primary | ICD-10-CM

## 2022-10-31 LAB
CTP QC/QA: YES
POC MOLECULAR INFLUENZA A AGN: POSITIVE
POC MOLECULAR INFLUENZA B AGN: NEGATIVE

## 2022-10-31 PROCEDURE — 1159F PR MEDICATION LIST DOCUMENTED IN MEDICAL RECORD: ICD-10-PCS | Mod: CPTII,S$GLB,, | Performed by: NURSE PRACTITIONER

## 2022-10-31 PROCEDURE — 3078F DIAST BP <80 MM HG: CPT | Mod: CPTII,S$GLB,, | Performed by: NURSE PRACTITIONER

## 2022-10-31 PROCEDURE — 1159F MED LIST DOCD IN RCRD: CPT | Mod: CPTII,S$GLB,, | Performed by: NURSE PRACTITIONER

## 2022-10-31 PROCEDURE — 99213 OFFICE O/P EST LOW 20 MIN: CPT | Mod: S$GLB,,, | Performed by: NURSE PRACTITIONER

## 2022-10-31 PROCEDURE — 87502 INFLUENZA DNA AMP PROBE: CPT | Mod: QW,S$GLB,, | Performed by: NURSE PRACTITIONER

## 2022-10-31 PROCEDURE — 3074F SYST BP LT 130 MM HG: CPT | Mod: CPTII,S$GLB,, | Performed by: NURSE PRACTITIONER

## 2022-10-31 PROCEDURE — 3078F PR MOST RECENT DIASTOLIC BLOOD PRESSURE < 80 MM HG: ICD-10-PCS | Mod: CPTII,S$GLB,, | Performed by: NURSE PRACTITIONER

## 2022-10-31 PROCEDURE — 87502 POCT INFLUENZA A/B MOLECULAR: ICD-10-PCS | Mod: QW,S$GLB,, | Performed by: NURSE PRACTITIONER

## 2022-10-31 PROCEDURE — 3074F PR MOST RECENT SYSTOLIC BLOOD PRESSURE < 130 MM HG: ICD-10-PCS | Mod: CPTII,S$GLB,, | Performed by: NURSE PRACTITIONER

## 2022-10-31 PROCEDURE — 1160F PR REVIEW ALL MEDS BY PRESCRIBER/CLIN PHARMACIST DOCUMENTED: ICD-10-PCS | Mod: CPTII,S$GLB,, | Performed by: NURSE PRACTITIONER

## 2022-10-31 PROCEDURE — 1160F RVW MEDS BY RX/DR IN RCRD: CPT | Mod: CPTII,S$GLB,, | Performed by: NURSE PRACTITIONER

## 2022-10-31 PROCEDURE — 99213 PR OFFICE/OUTPT VISIT, EST, LEVL III, 20-29 MIN: ICD-10-PCS | Mod: S$GLB,,, | Performed by: NURSE PRACTITIONER

## 2022-10-31 RX ORDER — AZELASTINE 1 MG/ML
1 SPRAY, METERED NASAL 2 TIMES DAILY
Qty: 30 ML | Refills: 0 | Status: SHIPPED | OUTPATIENT
Start: 2022-10-31 | End: 2022-11-10

## 2022-10-31 RX ORDER — PROMETHAZINE HYDROCHLORIDE AND DEXTROMETHORPHAN HYDROBROMIDE 6.25; 15 MG/5ML; MG/5ML
5 SYRUP ORAL NIGHTLY PRN
Qty: 118 ML | Refills: 0 | Status: SHIPPED | OUTPATIENT
Start: 2022-10-31

## 2022-10-31 RX ORDER — CETIRIZINE HYDROCHLORIDE 10 MG/1
10 TABLET ORAL DAILY
Qty: 30 TABLET | Refills: 0 | Status: SHIPPED | OUTPATIENT
Start: 2022-10-31

## 2022-10-31 RX ORDER — GUAIFENESIN 600 MG/1
1200 TABLET, EXTENDED RELEASE ORAL 2 TIMES DAILY
Qty: 40 TABLET | Refills: 0 | Status: SHIPPED | OUTPATIENT
Start: 2022-10-31 | End: 2022-11-10

## 2022-10-31 RX ORDER — ONDANSETRON 4 MG/1
4 TABLET, ORALLY DISINTEGRATING ORAL EVERY 6 HOURS PRN
Qty: 30 TABLET | Refills: 0 | Status: SHIPPED | OUTPATIENT
Start: 2022-10-31

## 2022-10-31 RX ORDER — FLUTICASONE PROPIONATE 50 MCG
1 SPRAY, SUSPENSION (ML) NASAL 2 TIMES DAILY
Qty: 9.9 ML | Refills: 0 | Status: SHIPPED | OUTPATIENT
Start: 2022-10-31

## 2022-10-31 NOTE — LETTER
October 31, 2022      [facility]  [address], [address]  [place] [address]  Phone: [phone]  Fax: [phone]       Patient: Galileo Johnson   YOB: 1995  Date of Visit: 10/31/2022    To Whom It May Concern:    Jarad Johnson  was at [facility] on 10/31/2022. The patient may return to work/school on 11/2/2022. If you have any questions or concerns, or if I can be of further assistance, please do not hesitate to contact me.    Sincerely,    Sergo Tenorio NP

## 2022-10-31 NOTE — PATIENT INSTRUCTIONS

## 2022-10-31 NOTE — PROGRESS NOTES
"Subjective:       Patient ID: Galileo Johnson is a 27 y.o. male.    Vitals:  height is 6' 1" (1.854 m) and weight is 59 kg (130 lb). His temperature is 98.9 °F (37.2 °C). His blood pressure is 111/68 and his pulse is 78. His respiration is 16 and oxygen saturation is 98%.     Chief Complaint: Cough    27-year-old male presents to clinic for evaluation of cough, fever, nasal congestion, body aches, fatigue x4 days.  Patient states that he was possibly exposed to flu.  He has been taking Tylenol, ibuprofen, and over-the-counter cough and cold medications with mild relief.  He denies chest pain or shortness of breath.  He is awake and alert, answers questions appropriately, no acute distress noted on today's visit.    Cough  This is a new problem. The current episode started in the past 7 days. The problem has been gradually worsening. The problem occurs every few minutes. The cough is Non-productive. Associated symptoms include a fever and nasal congestion. Pertinent negatives include no chest pain, chills or shortness of breath. Nothing aggravates the symptoms. He has tried OTC cough suppressant (tylenol, ibuprofen) for the symptoms. The treatment provided no relief.     Constitution: Positive for fatigue and fever. Negative for activity change, appetite change, chills and sweating.   HENT:  Positive for congestion.    Cardiovascular:  Negative for chest pain.   Respiratory:  Positive for cough. Negative for shortness of breath.    Gastrointestinal:  Positive for nausea. Negative for abdominal pain and vomiting.   Neurological:  Negative for dizziness.     Objective:      Physical Exam   Constitutional: He is oriented to person, place, and time. He appears well-developed.  Non-toxic appearance. He does not appear ill. No distress.   HENT:   Head: Normocephalic and atraumatic. Head is without abrasion, without contusion and without laceration.   Ears:   Right Ear: External ear normal.   Left Ear: External ear " normal.   Nose: Congestion present.   Mouth/Throat: Mucous membranes are normal. Mucous membranes are moist. Posterior oropharyngeal erythema present. No oropharyngeal exudate. Oropharynx is clear.   Eyes: Conjunctivae, EOM and lids are normal. Right eye exhibits no discharge. Left eye exhibits no discharge.   Neck: Trachea normal and phonation normal.   Cardiovascular: Normal rate and normal heart sounds.   Pulmonary/Chest: Effort normal and breath sounds normal. No stridor. No respiratory distress. He has no wheezes.   Abdominal: Normal appearance.   Musculoskeletal: Normal range of motion.         General: Normal range of motion.   Neurological: He is alert and oriented to person, place, and time.   Skin: Skin is warm, dry, intact, not diaphoretic and not pale. No abrasion, No burn and No ecchymosis   Psychiatric: His speech is normal and behavior is normal. Mood, judgment and thought content normal.   Nursing note and vitals reviewed.      Results for orders placed or performed in visit on 10/31/22   POCT Influenza A/B MOLECULAR   Result Value Ref Range    POC Molecular Influenza A Ag Positive (A) Negative, Not Reported    POC Molecular Influenza B Ag Negative Negative, Not Reported     Acceptable Yes        Assessment:       1. Influenza          Plan:         Influenza  -     POCT Influenza A/B MOLECULAR  -     cetirizine (ZYRTEC) 10 MG tablet; Take 1 tablet (10 mg total) by mouth once daily.  Dispense: 30 tablet; Refill: 0  -     fluticasone propionate (FLONASE) 50 mcg/actuation nasal spray; 1 spray (50 mcg total) by Each Nostril route 2 (two) times daily.  Dispense: 9.9 mL; Refill: 0  -     azelastine (ASTELIN) 137 mcg (0.1 %) nasal spray; 1 spray (137 mcg total) by Nasal route 2 (two) times daily. for 10 days  Dispense: 30 mL; Refill: 0  -     guaiFENesin (MUCINEX) 600 mg 12 hr tablet; Take 2 tablets (1,200 mg total) by mouth 2 (two) times daily. for 10 days  Dispense: 40 tablet; Refill: 0  -      promethazine-dextromethorphan (PROMETHAZINE-DM) 6.25-15 mg/5 mL Syrp; Take 5 mLs by mouth nightly as needed (cough).  Dispense: 118 mL; Refill: 0  -     ondansetron (ZOFRAN-ODT) 4 MG TbDL; Take 1 tablet (4 mg total) by mouth every 6 (six) hours as needed (nausea).  Dispense: 30 tablet; Refill: 0       Patient Instructions   - You must understand that you have received an Urgent Care treatment only and that you may be released before all of your medical problems are known or treated.   - You, the patient, will arrange for follow up care as instructed.   - If your condition worsens or fails to improve we recommend that you receive another evaluation at the ER immediately or contact your PCP to discuss your concerns or return here.       - Rest.    - Drink plenty of fluids.         - Tylenol or Ibuprofen as directed as needed for fever/pain. Avoid tylenol if you have a history of liver disease. Do not take ibuprofen if you have a history of GI bleeding, kidney disease, or if you take blood thinners.   - Take ibuprofen 600-800 mg every 6-8 hours for pain and inflammation.  You can also take Tylenol/acetaminophen 650-1000 mg every 6-8 hours for added pain relief.     - You can take over-the-counter claritin, zyrtec, allegra, or xyzal as directed. These are antihistamines that can help with runny nose, nasal congestion, sneezing, and helps to dry up post-nasal drip, which usually causes sore throat and cough.              - If you do NOT have high blood pressure, you may use a decongestant form (D)  of this medication or if you do not take the D form, you can take sudafed (pseudoephedrine) over the counter, which is a decongestant.     - You can use Flonase (fluticasone) nasal spray as directed for sinus congestion and postnasal drip. This is a steroid nasal spray that works locally over time to decrease the inflammation in your nose/sinuses and help with allergic symptoms. This is not an quick- relief spray like  afrin, but it works well if used daily.  Discontinue if you develop nose bleed  - use nasal saline prior to Flonase.     - Use Ocean Spray Nasal Saline 1-3 puffs each nostril every 2-3 hours then blow out onto tissue. This is to irrigate the nasal passage way to clear the sinus openings. Use until sinus problem resolved.     - you can take plain Mucinex (guaifenesin) 1200 mg twice a day to help loosen mucous     -warm salt water gargles can help with sore throat     - warm tea with honey can help with cough. Honey is a natural cough suppressant.     - Follow up with your PCP or specialty clinic as directed in the next 1-2 weeks if not improved or as needed.  You can call [phone] to schedule an appointment with the appropriate provider.       - Go to the ER if you develop new or worsening symptoms.

## 2023-07-26 ENCOUNTER — HOSPITAL ENCOUNTER (EMERGENCY)
Facility: HOSPITAL | Age: 28
Discharge: HOME | End: 2023-07-26
Attending: EMERGENCY MEDICINE
Payer: MEDICAID

## 2023-07-26 VITALS
HEIGHT: 73 IN | OXYGEN SATURATION: 98 % | HEART RATE: 108 BPM | SYSTOLIC BLOOD PRESSURE: 115 MMHG | RESPIRATION RATE: 20 BRPM | DIASTOLIC BLOOD PRESSURE: 71 MMHG | BODY MASS INDEX: 17.23 KG/M2 | TEMPERATURE: 99 F | WEIGHT: 130 LBS

## 2023-07-26 DIAGNOSIS — U07.1 COVID-19: Primary | ICD-10-CM

## 2023-07-26 LAB
CTP QC/QA: YES
CTP QC/QA: YES
POC MOLECULAR INFLUENZA A AGN: NEGATIVE
POC MOLECULAR INFLUENZA B AGN: NEGATIVE
SARS-COV-2 RDRP RESP QL NAA+PROBE: POSITIVE

## 2023-07-26 PROCEDURE — 99284 EMERGENCY DEPT VISIT MOD MDM: CPT

## 2023-07-26 PROCEDURE — 25000003 PHARM REV CODE 250: Performed by: NURSE PRACTITIONER

## 2023-07-26 PROCEDURE — 87502 INFLUENZA DNA AMP PROBE: CPT

## 2023-07-26 PROCEDURE — 87635 SARS-COV-2 COVID-19 AMP PRB: CPT | Performed by: EMERGENCY MEDICINE

## 2023-07-26 RX ORDER — ONDANSETRON 4 MG/1
4 TABLET, ORALLY DISINTEGRATING ORAL EVERY 6 HOURS PRN
Qty: 20 TABLET | Refills: 0 | Status: SHIPPED | OUTPATIENT
Start: 2023-07-26

## 2023-07-26 RX ORDER — IBUPROFEN 400 MG/1
800 TABLET ORAL
Status: COMPLETED | OUTPATIENT
Start: 2023-07-26 | End: 2023-07-26

## 2023-07-26 RX ORDER — IBUPROFEN 600 MG/1
600 TABLET ORAL EVERY 6 HOURS PRN
Qty: 20 TABLET | Refills: 0 | Status: SHIPPED | OUTPATIENT
Start: 2023-07-26

## 2023-07-26 RX ORDER — ACETAMINOPHEN 500 MG
1000 TABLET ORAL
Status: COMPLETED | OUTPATIENT
Start: 2023-07-26 | End: 2023-07-26

## 2023-07-26 RX ORDER — ONDANSETRON 4 MG/1
4 TABLET, ORALLY DISINTEGRATING ORAL
Status: COMPLETED | OUTPATIENT
Start: 2023-07-26 | End: 2023-07-26

## 2023-07-26 RX ADMIN — IBUPROFEN 800 MG: 400 TABLET ORAL at 11:07

## 2023-07-26 RX ADMIN — ACETAMINOPHEN 1000 MG: 500 TABLET ORAL at 11:07

## 2023-07-26 RX ADMIN — ONDANSETRON 4 MG: 4 TABLET, ORALLY DISINTEGRATING ORAL at 11:07

## 2023-07-26 NOTE — Clinical Note
"Galileo"Keerthi Johnson was seen and treated in our emergency department on 7/26/2023.     COVID-19 is present in our communities across the state. There is limited testing for COVID at this time, so not all patients can be tested. In this situation, your employee meets the following criteria:    Galileo Johnson has met the criteria for COVID-19 testing and has a POSITIVE result. He can return to work once they are asymptomatic for 24 hours without the use of fever reducing medications AND at least five days from the first positive result. A mask is recommended for 5 days post quarantine.     If you have any questions or concerns, or if I can be of further assistance, please do not hesitate to contact me.    Sincerely,             Destin Shields NP"

## 2023-07-26 NOTE — ED PROVIDER NOTES
Encounter Date: 7/26/2023    SCRIBE #1 NOTE: I, Carlyjohanna Madden, am scribing for, and in the presence of,  Destin Shields NP. I have scribed the following portions of the note - Other sections scribed: HPI, ROS.       History     Chief Complaint   Patient presents with    COVID-19 Concerns     Pt reports body aches, fever/chills, n/v, and h/a that started yesterday.      This 28 y.o male, with a medical history of Irritable bowel syndrome, presents to the ED c/o fever, cough, congestion, headache, generalized body aches, nausea and emesis that began at 10:30 pm last night. Pt reports taking Ibuprofen this morning for treatment. The symptoms are acute, constant and severe (10/10). No recent known sick contacts. No other associated symptoms.     The history is provided by the patient.     Review of patient's allergies indicates:  No Known Allergies  Past Medical History:   Diagnosis Date    IBS (irritable bowel syndrome)      History reviewed. No pertinent surgical history.  Family History   Problem Relation Age of Onset    Heart disease Father     Diabetes Maternal Grandfather     Hypertension Maternal Grandfather     Drug abuse Paternal Grandmother      Social History     Tobacco Use    Smoking status: Former     Current packs/day: 2.00     Types: Cigarettes    Smokeless tobacco: Never   Substance Use Topics    Alcohol use: Not Currently    Drug use: Yes     Types: Marijuana     Review of Systems   Constitutional:  Positive for fever.   HENT:  Positive for congestion. Negative for sore throat.    Respiratory:  Positive for cough. Negative for shortness of breath.    Cardiovascular:  Negative for chest pain.   Gastrointestinal:  Positive for nausea and vomiting.   Genitourinary:  Negative for dysuria.   Musculoskeletal:  Positive for myalgias (generalized). Negative for back pain.   Skin:  Negative for rash.   Neurological:  Positive for headaches. Negative for weakness.       Physical Exam     Initial Vitals  [07/26/23 1034]   BP Pulse Resp Temp SpO2   115/71 108 20 98.7 °F (37.1 °C) 98 %      MAP       --         Physical Exam    Nursing note and vitals reviewed.  Constitutional: He appears well-developed and well-nourished. He is not diaphoretic.  Non-toxic appearance. He does not have a sickly appearance. He appears ill. No distress.   HENT:   Head: Normocephalic and atraumatic.   Right Ear: External ear normal.   Left Ear: External ear normal.   Nose: Nose normal.   Eyes: EOM are normal. Right eye exhibits no discharge. Left eye exhibits no discharge.   Neck: Neck supple. No tracheal deviation present.   Normal range of motion.  Cardiovascular:  Normal rate.           Pulmonary/Chest: No stridor. No respiratory distress.   Abdominal: Abdomen is soft. He exhibits no distension. There is no abdominal tenderness.   Musculoskeletal:         General: No tenderness. Normal range of motion.      Cervical back: Normal range of motion and neck supple.     Neurological: He is alert and oriented to person, place, and time. He has normal strength. No cranial nerve deficit.   Skin: Skin is warm and dry.   Psychiatric: He has a normal mood and affect. His behavior is normal. Judgment and thought content normal.         ED Course   Procedures  Labs Reviewed   SARS-COV-2 RDRP GENE - Abnormal; Notable for the following components:       Result Value    POC Rapid COVID Positive (*)     All other components within normal limits   POCT INFLUENZA A/B MOLECULAR          Imaging Results    None          Medications   ibuprofen tablet 800 mg (800 mg Oral Given 7/26/23 1119)   acetaminophen tablet 1,000 mg (1,000 mg Oral Given 7/26/23 1119)   ondansetron disintegrating tablet 4 mg (4 mg Oral Given 7/26/23 1119)     Medical Decision Making:   History:   Old Medical Records: I decided to obtain old medical records.  Clinical Tests:   Lab Tests: Ordered and Reviewed  ED Management:  DDx:  Influenza, viral syndrome, COVID-19, strep pharyngitis,  viral pharyngitis, otitis media, sinusitis, pneumonia, bronchitis, meningitis, sepsis, others    HPI and physical exam as above.      The patient appears to have a viral infection.  Positive for COVID-19.  Based upon the history and physical exam the patient does not appear to have a serious bacterial infection such as sepsis, otitis media, bacterial sinusitis, strep pharyngitis, parapharyngeal or peritonsillar abscess, meningitis. Respiratory effort is normal.  Mucous membranes are moist and the patient is tolerating P.O. without difficulty.  Patient is afebrile throughout the ED course.  Patient is nontoxic, alert, active, and appears very well at this time just prior to discharge.  Room air oxygen saturation 98%.  I have given specific return precautions to the patient regarding dyspnea.  I will prescribe medications to treat the patient's symptoms.     The results and physical exam findings were reviewed with the patient.  I advised the patient to remain home and self-isolate from others. The patient should wear a surgical mask at all times when near others.  Strict ED return precautions given especially concerning worsening shortness of breath/dyspnea. All questions regarding diagnosis and plan were answered to the patient's fullest possible satisfaction. Patient expressed understanding of diagnosis, discharge instructions, and return precautions.          Scribe Attestation:   Scribe #1: I performed the above scribed service and the documentation accurately describes the services I performed. I attest to the accuracy of the note.                   I, Destin Shields NP, personally performed the services described in this documentation. All medical record entries made by the scribe were at my direction and in my presence. I have reviewed the chart and agree that the record reflects my personal performance and is accurate and complete.   Clinical Impression:   Final diagnoses:  [U07.1] COVID-19 (Primary)         ED Disposition Condition    Discharge Stable          ED Prescriptions       Medication Sig Dispense Start Date End Date Auth. Provider    nirmatrelvir-ritonavir 300 mg (150 mg x 2)-100 mg copackaged tablets (EUA) () Take 3 tablets by mouth 2 (two) times daily for 5 days. Each dose contains 2 nirmatrelvir (pink tablets) and 1 ritonavir (white tablet). Take all 3 tablets together 30 tablet 2023 Destin Shields NP    ibuprofen (ADVIL,MOTRIN) 600 MG tablet Take 1 tablet (600 mg total) by mouth every 6 (six) hours as needed for Pain. 20 tablet 2023 -- Destin Shields NP    ondansetron (ZOFRAN-ODT) 4 MG TbDL Take 1 tablet (4 mg total) by mouth every 6 (six) hours as needed (Nausea). 20 tablet 2023 -- Destin Shields NP          Follow-up Information       Follow up With Specialties Details Why Contact Info    Keren Hargrove, NP Family Medicine Schedule an appointment as soon as possible for a visit in 1 week For further evaluation [address] [facility]  [address]  [facility] [address]  [phone]      [facility] [place] [facility] [place] [facility] Emergency Medicine Go to  If symptoms worsen, As needed [address]  [facility] [address] [phone]             Destin Shields NP  23 [phone]

## 2023-07-26 NOTE — ED TRIAGE NOTES
"Pt. States, " I think I have Covid". Pt. Reports he has gen body aches, chilld and fevers. Pt. Reports his symptoms started on yesterday.   "

## 2023-07-26 NOTE — DISCHARGE INSTRUCTIONS

## 2024-05-05 NOTE — ED NOTES
History of IBS, partner at bedside.   in process of exam.  
Ice pack applied to groin as per MD order  
In US per stretcher  
Physician at bedside.    
Pt seen at urgent care and was sent to ED for further eval.   
No